# Patient Record
Sex: FEMALE | Race: WHITE | Employment: OTHER | URBAN - METROPOLITAN AREA
[De-identification: names, ages, dates, MRNs, and addresses within clinical notes are randomized per-mention and may not be internally consistent; named-entity substitution may affect disease eponyms.]

---

## 2021-08-31 ENCOUNTER — OFFICE VISIT (OUTPATIENT)
Dept: OBGYN CLINIC | Age: 71
End: 2021-08-31
Payer: MEDICARE

## 2021-08-31 VITALS
SYSTOLIC BLOOD PRESSURE: 171 MMHG | HEIGHT: 63 IN | BODY MASS INDEX: 25.16 KG/M2 | DIASTOLIC BLOOD PRESSURE: 76 MMHG | WEIGHT: 142 LBS

## 2021-08-31 DIAGNOSIS — Z01.419 ROUTINE GYNECOLOGICAL EXAMINATION: Primary | ICD-10-CM

## 2021-08-31 DIAGNOSIS — Z12.31 SCREENING MAMMOGRAM FOR HIGH-RISK PATIENT: ICD-10-CM

## 2021-08-31 DIAGNOSIS — Z12.4 SCREENING FOR MALIGNANT NEOPLASM OF CERVIX: ICD-10-CM

## 2021-08-31 PROCEDURE — 1090F PRES/ABSN URINE INCON ASSESS: CPT | Performed by: OBSTETRICS & GYNECOLOGY

## 2021-08-31 PROCEDURE — G0101 CA SCREEN;PELVIC/BREAST EXAM: HCPCS | Performed by: OBSTETRICS & GYNECOLOGY

## 2021-08-31 PROCEDURE — 3017F COLORECTAL CA SCREEN DOC REV: CPT | Performed by: OBSTETRICS & GYNECOLOGY

## 2021-08-31 PROCEDURE — G8419 CALC BMI OUT NRM PARAM NOF/U: HCPCS | Performed by: OBSTETRICS & GYNECOLOGY

## 2021-08-31 PROCEDURE — G9899 SCRN MAM PERF RSLTS DOC: HCPCS | Performed by: OBSTETRICS & GYNECOLOGY

## 2021-08-31 PROCEDURE — G8432 DEP SCR NOT DOC, RNG: HCPCS | Performed by: OBSTETRICS & GYNECOLOGY

## 2021-08-31 PROCEDURE — 1101F PT FALLS ASSESS-DOCD LE1/YR: CPT | Performed by: OBSTETRICS & GYNECOLOGY

## 2021-08-31 NOTE — PROGRESS NOTES
HISTORY OF PRESENT ILLNESS  Isabel Mosquera is a 73 y.o. female who presents today for the following:  Chief Complaint   Patient presents with    Annual Exam   Is a 82-year-old G2, P2 female who presents today for routine annual exam.  She is without complaints on presentation today.     No Known Allergies    No current outpatient medications on file. No current facility-administered medications for this visit. History reviewed. No pertinent past medical history. History reviewed. No pertinent surgical history. History reviewed. No pertinent family history. Social History     Socioeconomic History    Marital status:      Spouse name: Not on file    Number of children: Not on file    Years of education: Not on file    Highest education level: Not on file   Occupational History    Not on file   Tobacco Use    Smoking status: Current Some Day Smoker    Smokeless tobacco: Never Used   Substance and Sexual Activity    Alcohol use: Not on file    Drug use: Never    Sexual activity: Not Currently   Other Topics Concern    Not on file   Social History Narrative    Not on file     Social Determinants of Health     Financial Resource Strain:     Difficulty of Paying Living Expenses:    Food Insecurity:     Worried About Running Out of Food in the Last Year:     [address] in the Last Year:    Transportation Needs:     Lack of Transportation (Medical):      Lack of Transportation (Non-Medical):    Physical Activity:     Days of Exercise per Week:     Minutes of Exercise per Session:    Stress:     Feeling of Stress :    Social Connections:     Frequency of Communication with Friends and Family:     Frequency of Social Gatherings with Friends and Family:     Attends Temple Services:     Active Member of Clubs or Organizations:     Attends Club or Organization Meetings:     Marital Status:    Intimate Partner Violence:     Fear of Current or Ex-Partner:     Emotionally Abused:     Physically Abused:     Sexually Abused:            REVIEW OF SYSTEMS     Constitutional: Negative for chills, fever and malaise/fatigue. HENT: Negative for congestion, hearing loss and sore throat. Respiratory: Negative for cough, sputum production, shortness of breath and wheezing. Cardiovascular: Negative for chest pain. Gastrointestinal: Negative for abdominal pain, constipation, diarrhea, nausea and vomiting. Genitourinary: Negative for dysuria, flank pain, hematuria and urgency. Neurological: Negative for dizziness, loss of consciousness, weakness and headaches. Psychiatric/Behavioral: Negative for depression.      PHYSICAL EXAM  BP (!) 171/76 (BP 1 Location: Right upper arm, BP Patient Position: Sitting, BP Cuff Size: Small adult)   Ht 5' 3\" (1.6 m)   Wt 142 lb (64.4 kg)   BMI 25.15 kg/m²      Patient is a well-developed well-nourished female no apparent distress  She is alert and oriented x3  Head is normocephalic atraumatic pupils equal round react light accommodation  Neck is supple without adenopathy or thyromegaly  Heart is with regular rate and rhythm without murmurs rubs or gallops  Lungs are clear to auscultation and percussion bilaterally  Breasts are without masses bilaterally  Abdomen is soft nontender nondistended bowel sounds are present and active  Extremities are without clubbing cyanosis or edema  Pulses are full and symmetric bilaterally  Pelvic  External genitalia within normal limits  Urethra is midline there are no apparent urethral lesions the bladder is within normal limits  Vagina is with normal rugae there is minimal discharge present in the vaginal vault  Cervix is parous, there are no apparent cervical lesions, there is no cervical motion tenderness  Uterus is normal size and contours  Adnexa are without masses    ASSESSMENT and PLAN  Normal annual exam  Plan: Pap performed, mammogram ordered, follow-up as needed and yearly  No results found for this visit on 08/31/21. No orders of the defined types were placed in this encounter.

## 2021-09-03 LAB
C TRACH RRNA CVX QL NAA+PROBE: NEGATIVE
CYTOLOGIST CVX/VAG CYTO: NORMAL
CYTOLOGY CVX/VAG DOC CYTO: NORMAL
CYTOLOGY CVX/VAG DOC THIN PREP: NORMAL
DX ICD CODE: NORMAL
LABCORP, 190119: NORMAL
Lab: NORMAL
N GONORRHOEA RRNA CVX QL NAA+PROBE: NEGATIVE
OTHER STN SPEC: NORMAL
STAT OF ADQ CVX/VAG CYTO-IMP: NORMAL
T VAGINALIS RRNA SPEC QL NAA+PROBE: NEGATIVE

## 2022-10-28 ENCOUNTER — APPOINTMENT (RX ONLY)
Dept: URBAN - METROPOLITAN AREA CLINIC 187 | Facility: CLINIC | Age: 72
Setting detail: DERMATOLOGY
End: 2022-10-28

## 2022-10-28 DIAGNOSIS — L57.8 OTHER SKIN CHANGES DUE TO CHRONIC EXPOSURE TO NONIONIZING RADIATION: ICD-10-CM | Status: STABLE

## 2022-10-28 DIAGNOSIS — L81.4 OTHER MELANIN HYPERPIGMENTATION: ICD-10-CM

## 2022-10-28 DIAGNOSIS — L57.0 ACTINIC KERATOSIS: ICD-10-CM

## 2022-10-28 DIAGNOSIS — Z71.89 OTHER SPECIFIED COUNSELING: ICD-10-CM

## 2022-10-28 PROCEDURE — ? LIQUID NITROGEN

## 2022-10-28 PROCEDURE — 99213 OFFICE O/P EST LOW 20 MIN: CPT | Mod: 25

## 2022-10-28 PROCEDURE — ? COUNSELING

## 2022-10-28 PROCEDURE — 17000 DESTRUCT PREMALG LESION: CPT

## 2022-10-28 PROCEDURE — 17003 DESTRUCT PREMALG LES 2-14: CPT

## 2022-10-28 ASSESSMENT — LOCATION DETAILED DESCRIPTION DERM
LOCATION DETAILED: RIGHT MEDIAL DISTAL PRETIBIAL REGION
LOCATION DETAILED: LEFT DISTAL PRETIBIAL REGION
LOCATION DETAILED: RIGHT PROXIMAL DORSAL FOREARM
LOCATION DETAILED: RIGHT LATERAL FOREHEAD
LOCATION DETAILED: RIGHT VENTRAL DISTAL FOREARM
LOCATION DETAILED: RIGHT MEDIAL SUPERIOR CHEST
LOCATION DETAILED: RIGHT POSTERIOR SHOULDER
LOCATION DETAILED: RIGHT ANTERIOR SHOULDER
LOCATION DETAILED: RIGHT ANTERIOR PROXIMAL UPPER ARM
LOCATION DETAILED: LEFT SUPERIOR HELIX
LOCATION DETAILED: RIGHT DISTAL LATERAL CALF
LOCATION DETAILED: RIGHT SUPERIOR UPPER BACK
LOCATION DETAILED: LEFT POSTERIOR SHOULDER
LOCATION DETAILED: LEFT PROXIMAL CALF
LOCATION DETAILED: LEFT ULNAR DORSAL HAND
LOCATION DETAILED: RIGHT PROXIMAL PRETIBIAL REGION
LOCATION DETAILED: LEFT SUPERIOR UPPER BACK
LOCATION DETAILED: RIGHT VENTRAL PROXIMAL FOREARM
LOCATION DETAILED: RIGHT DISTAL PRETIBIAL REGION
LOCATION DETAILED: LEFT PROXIMAL DORSAL FOREARM
LOCATION DETAILED: RIGHT PROXIMAL POSTERIOR UPPER ARM
LOCATION DETAILED: LEFT VENTRAL PROXIMAL FOREARM
LOCATION DETAILED: LEFT ANTERIOR SHOULDER
LOCATION DETAILED: NASAL DORSUM
LOCATION DETAILED: SUPERIOR MID FOREHEAD
LOCATION DETAILED: LEFT DISTAL DORSAL FOREARM
LOCATION DETAILED: RIGHT SUPERIOR HELIX
LOCATION DETAILED: LEFT ANTERIOR PROXIMAL UPPER ARM
LOCATION DETAILED: LEFT INFERIOR LATERAL FOREHEAD
LOCATION DETAILED: RIGHT SUPERIOR LATERAL UPPER BACK
LOCATION DETAILED: RIGHT RADIAL DORSAL HAND
LOCATION DETAILED: LEFT DISTAL CALF
LOCATION DETAILED: LEFT PROXIMAL POSTERIOR UPPER ARM
LOCATION DETAILED: LEFT VENTRAL DISTAL FOREARM
LOCATION DETAILED: RIGHT PROXIMAL RADIAL DORSAL FOREARM
LOCATION DETAILED: RIGHT DISTAL CALF

## 2022-10-28 ASSESSMENT — LOCATION SIMPLE DESCRIPTION DERM
LOCATION SIMPLE: RIGHT HAND
LOCATION SIMPLE: LEFT PRETIBIAL REGION
LOCATION SIMPLE: LEFT UPPER ARM
LOCATION SIMPLE: RIGHT UPPER BACK
LOCATION SIMPLE: LEFT HAND
LOCATION SIMPLE: CHEST
LOCATION SIMPLE: LEFT UPPER BACK
LOCATION SIMPLE: LEFT POSTERIOR UPPER ARM
LOCATION SIMPLE: RIGHT CALF
LOCATION SIMPLE: LEFT FOREARM
LOCATION SIMPLE: RIGHT BACK
LOCATION SIMPLE: SUPERIOR FOREHEAD
LOCATION SIMPLE: LEFT FOREHEAD
LOCATION SIMPLE: RIGHT PRETIBIAL REGION
LOCATION SIMPLE: RIGHT SHOULDER
LOCATION SIMPLE: LEFT CALF
LOCATION SIMPLE: RIGHT EAR
LOCATION SIMPLE: LEFT EAR
LOCATION SIMPLE: RIGHT FOREARM
LOCATION SIMPLE: RIGHT UPPER ARM
LOCATION SIMPLE: NOSE
LOCATION SIMPLE: RIGHT FOREHEAD
LOCATION SIMPLE: RIGHT POSTERIOR UPPER ARM
LOCATION SIMPLE: LEFT SHOULDER

## 2022-10-28 ASSESSMENT — LOCATION ZONE DERM
LOCATION ZONE: TRUNK
LOCATION ZONE: NOSE
LOCATION ZONE: EAR
LOCATION ZONE: ARM
LOCATION ZONE: FACE
LOCATION ZONE: LEG
LOCATION ZONE: HAND

## 2022-10-28 NOTE — PROCEDURE: LIQUID NITROGEN
Render Note In Bullet Format When Appropriate: No
Duration Of Freeze Thaw-Cycle (Seconds): 10
Application Tool (Optional): Cry-AC
Post-Care Instructions: I reviewed with the patient in detail post-care instructions. Patient is to wear sunprotection, and avoid picking at any of the treated lesions. Pt may apply Vaseline to crusted or scabbing areas.
Number Of Freeze-Thaw Cycles: 3 freeze-thaw cycles
Show Aperture Variable?: Yes
Consent: The patient's consent was obtained including but not limited to risks of crusting, scabbing, blistering, scarring, darker or lighter pigmentary change, recurrence, incomplete removal and infection.
Detail Level: Detailed

## 2022-10-28 NOTE — PROCEDURE: MIPS QUALITY
Quality 265: Biopsy Follow-Up: Biopsy results reviewed, communicated, tracked, and documented
Quality 47: Advance Care Plan: Advance Care Planning discussed and documented; advance care plan or surrogate decision maker documented in the medical record.
Quality 110: Preventive Care And Screening: Influenza Immunization: Influenza immunization was not ordered or administered, reason not given
Name And Contact Information For Health Care Proxy:  Harmony Chavezlo
Quality 226: Preventive Care And Screening: Tobacco Use: Screening And Cessation Intervention: Patient screened for tobacco use, is a smoker AND received Cessation Counseling within the Previous 12 Months
Detail Level: Detailed
Quality 130: Documentation Of Current Medications In The Medical Record: Current Medications Documented

## 2022-10-28 NOTE — PROCEDURE: COUNSELING
Sunscreen Recommendations: Elta-MD
Skin Checks Recommendations: Q6-12 months
Detail Level: Detailed
Detail Level: Generalized

## 2022-12-26 ENCOUNTER — APPOINTMENT (OUTPATIENT)
Dept: GENERAL RADIOLOGY | Age: 72
End: 2022-12-26
Attending: FAMILY MEDICINE
Payer: MEDICARE

## 2022-12-26 ENCOUNTER — HOSPITAL ENCOUNTER (EMERGENCY)
Age: 72
Discharge: HOME | End: 2022-12-26
Attending: FAMILY MEDICINE
Payer: MEDICARE

## 2022-12-26 VITALS
SYSTOLIC BLOOD PRESSURE: 154 MMHG | OXYGEN SATURATION: 99 % | RESPIRATION RATE: 16 BRPM | WEIGHT: 132 LBS | DIASTOLIC BLOOD PRESSURE: 51 MMHG | HEART RATE: 62 BPM | TEMPERATURE: 97.9 F | HEIGHT: 63 IN | BODY MASS INDEX: 23.39 KG/M2

## 2022-12-26 DIAGNOSIS — J06.9 VIRAL URI WITH COUGH: Primary | ICD-10-CM

## 2022-12-26 LAB
FLUAV AG NPH QL IA: NEGATIVE
FLUBV AG NOSE QL IA: NEGATIVE

## 2022-12-26 PROCEDURE — 71046 X-RAY EXAM CHEST 2 VIEWS: CPT

## 2022-12-26 PROCEDURE — 87804 INFLUENZA ASSAY W/OPTIC: CPT

## 2022-12-26 PROCEDURE — 99283 EMERGENCY DEPT VISIT LOW MDM: CPT

## 2022-12-26 RX ORDER — PREDNISONE 20 MG/1
40 TABLET ORAL DAILY
Qty: 10 TABLET | Refills: 0 | Status: SHIPPED | OUTPATIENT
Start: 2022-12-26 | End: 2022-12-31

## 2022-12-26 NOTE — Clinical Note
[facility] EMERGENCY DEPARTMENT  [address] [facility] [id number] [phone]    Work/School Note    Date: 12/26/2022    To Whom It May concern:    Rene Hansen was seen and treated today in the emergency room by the following provider(s):  Attending Provider: Liza Nair DO. Rene Hansen is excused from work/school on 12/26/22 and 12/27/22. She is medically clear to return to work/school on 12/28/2022.        Sincerely,          Bhavin Banks DO

## 2022-12-26 NOTE — DISCHARGE INSTRUCTIONS
Thank you! Thank you for allowing me to care for you in the emergency department. It is my goal to provide you with excellent care. If you have not received excellent quality care, please ask to speak to the nurse manager. Please fill out the survey that will come to you by mail or email since we listen to your feedback! Below you will find a list of your tests from today's visit. Should you have any questions, please do not hesitate to call the emergency department. Labs  Recent Results (from the past 12 hour(s))   INFLUENZA A & B AG (RAPID TEST)    Collection Time: 12/26/22  7:40 AM   Result Value Ref Range    Influenza A Antigen Negative Negative      Influenza B Antigen Negative Negative         Radiologic Studies  XR CHEST PA LAT   Final Result   No acute cardiopulmonary disease. CT Results  (Last 48 hours)      None          CXR Results  (Last 48 hours)                 12/26/22 0750  XR CHEST PA LAT Final result    Impression:  No acute cardiopulmonary disease. Narrative: Indication: Cough. Exam: PA and lateral views of the chest.       There is no prior study for direct comparison. Findings: Cardiomediastinal silhouette is within normal limits. Lungs are clear   bilaterally. Pleural spaces are normal. Osseous structures are intact.                 ------------------------------------------------------------------------------------------------------------  The exam and treatment you received in the Emergency Department were for an urgent problem and are not intended as complete care. It is important that you follow-up with a doctor, nurse practitioner, or physician assistant to:  (1) confirm your diagnosis,  (2) re-evaluation of changes in your illness and treatment, and  (3) for ongoing care. Please take your discharge instructions with you when you go to your follow-up appointment.      If you have any problem arranging a follow-up appointment, contact the Emergency Department. If your symptoms become worse or you do not improve as expected and you are unable to reach your health care provider, please return to the Emergency Department. We are available 24 hours a day. If a prescription has been provided, please have it filled as soon as possible to prevent a delay in treatment. If you have any questions or reservations about taking the medication due to side effects or interactions with other medications, please call your primary care provider or contact the ER.

## 2022-12-26 NOTE — ED TRIAGE NOTES
4 days ago, started cough yellow mucus, headache and runny nose, no other symptoms, eating drnking well, voiding well. Cough medicine hasn't helped. Came from [place] 5 days ago.

## 2022-12-26 NOTE — ED PROVIDER NOTES
EMERGENCY DEPARTMENT HISTORY AND PHYSICAL EXAM      Date: 12/26/2022  Patient Name: Ophelia Valdez    History of Presenting Illness         History Provided By: Patient    HPI: Ophelia Valdez, 81 y.o. female presents to the ED with CC of cough, congestion. Family members with similar symptoms. No difficulty breathing. Still eating and drinking well. No fevers chills rigors. Patient denies alleviating nor aggravating factors. Patient denies SOB, chest pain, or any neurological symptoms. Patient denies any other symptoms nor concerns at this time  Past History     Past Medical History:  History reviewed. No pertinent past medical history. Allergies:  No Known Allergies    Review of Systems   Vital signs and nursing notes reviewed  Review of Systems   Constitutional:  Negative for activity change, appetite change, chills, fatigue and fever. HENT:  Positive for congestion. Negative for sore throat. Eyes:  Negative for photophobia and visual disturbance. Respiratory:  Positive for cough. Negative for shortness of breath and wheezing. Cardiovascular:  Negative for chest pain, palpitations and leg swelling. Gastrointestinal:  Negative for abdominal pain, diarrhea, nausea and vomiting. Endocrine: Negative for cold intolerance and heat intolerance. Musculoskeletal:  Negative for gait problem and joint swelling. Skin:  Negative for color change and rash. Neurological:  Negative for dizziness and headaches. Physical Exam   Visit Vitals  BP (!) 154/51 (BP 1 Location: Left upper arm, BP Patient Position: Sitting)   Pulse 62   Temp 97.9 °F (36.6 °C)   Resp 16   Ht 5' 3\" (1.6 m)   Wt 59.9 kg (132 lb)   SpO2 99%   BMI 23.38 kg/m²     CONSTITUTIONAL: Alert, in no distress. Appears stated age.,  Nontoxic  HEAD:  Normocephalic, atraumatic, uvula midline, no muffled voice, no findings of peritonsillar abscess, tolerating secretions with ease  EYES: EOM intact.   No conjunctival injection or scleral icterus  Neck:  Supple. No meningismus,  RESP: No increased work of breathing, lungs clear to auscultation bilaterally. No wheezes rales nor rhonchi  CV: Heart is regular rate and rhythm, no murmurs, no JVD, capillary refill less than 2 seconds  NEURO: Moves all extremities spontaneously. No motor nor sensory deficits. No focal neurologic deficits. PSYCH: Normal mood, normal affect      ED course/medical decision making       Patient presented to the emergency department with the aforementioned chief complaint. On examination the patient is nontoxic and overall well-appearing. No hypoxia. Well-hydrated on exam.  Lungs are clear to auscultation bilaterally. No increased work of breathing. COVID-19 testing was not conducted. Patient was given quarantine/isolation recommendations and agrees with the plan to be discharged home. They were provided instructions to return to the emergency department with any difficulty breathing, chest pain, altered mentation or any other new or worsening symptoms. Patient was discharged home in stable and ambulatory condition. Critical Care Time: None    Disposition:  DISCHARGE NOTE:  The pt is ready for discharge. The pt's signs, symptoms, diagnosis, and discharge instructions have been discussed and pt has conveyed their understanding. The pt is to follow up as recommended or return to ER should their symptoms worsen. Plan has been discussed and pt is in agreement. PLAN:  1. Current Discharge Medication List        START taking these medications    Details   predniSONE (DELTASONE) 20 mg tablet Take 2 Tablets by mouth daily for 5 days. With Breakfast  Qty: 10 Tablet, Refills: 0  Start date: 12/26/2022, End date: 12/31/2022           2. Follow-up Information       Follow up With Specialties Details Why Contact Info    Your primary care doctor  Schedule an appointment as soon as possible for a visit in 2 days            3.  Take Tylenol or Ibuprofen as needed  4. Drink plenty of fluids  5. Return to ED if worse especially if any shortness of breath, chest pain or altered mentation. Diagnosis     Clinical Impression:   1. Viral URI with cough            Please note that this dictation was completed with [employer], the computer voice recognition software. Quite often unanticipated grammatical, syntax, homophones, and other interpretive errors are inadvertently transcribed by the computer software. Please disregards these errors. Please excuse any errors that have escaped final proofreading.

## 2023-04-28 ENCOUNTER — APPOINTMENT (RX ONLY)
Dept: URBAN - METROPOLITAN AREA CLINIC 187 | Facility: CLINIC | Age: 73
Setting detail: DERMATOLOGY
End: 2023-04-28

## 2023-04-28 DIAGNOSIS — L82.1 OTHER SEBORRHEIC KERATOSIS: ICD-10-CM | Status: UNCHANGED

## 2023-04-28 DIAGNOSIS — L85.3 XEROSIS CUTIS: ICD-10-CM

## 2023-04-28 DIAGNOSIS — L57.8 OTHER SKIN CHANGES DUE TO CHRONIC EXPOSURE TO NONIONIZING RADIATION: ICD-10-CM | Status: INADEQUATELY CONTROLLED

## 2023-04-28 DIAGNOSIS — L57.0 ACTINIC KERATOSIS: ICD-10-CM

## 2023-04-28 DIAGNOSIS — D18.0 HEMANGIOMA: ICD-10-CM | Status: UNCHANGED

## 2023-04-28 DIAGNOSIS — L81.4 OTHER MELANIN HYPERPIGMENTATION: ICD-10-CM | Status: UNCHANGED

## 2023-04-28 PROBLEM — D18.01 HEMANGIOMA OF SKIN AND SUBCUTANEOUS TISSUE: Status: ACTIVE | Noted: 2023-04-28

## 2023-04-28 PROCEDURE — ? OTC TREATMENT REGIMEN

## 2023-04-28 PROCEDURE — ? LIQUID NITROGEN

## 2023-04-28 PROCEDURE — ? SUNSCREEN RECOMMENDATIONS

## 2023-04-28 PROCEDURE — ? EDUCATIONAL RESOURCES PROVIDED

## 2023-04-28 PROCEDURE — 17003 DESTRUCT PREMALG LES 2-14: CPT

## 2023-04-28 PROCEDURE — ? COUNSELING

## 2023-04-28 PROCEDURE — 99213 OFFICE O/P EST LOW 20 MIN: CPT | Mod: 25

## 2023-04-28 PROCEDURE — 17000 DESTRUCT PREMALG LESION: CPT

## 2023-04-28 ASSESSMENT — LOCATION DETAILED DESCRIPTION DERM
LOCATION DETAILED: RIGHT LATERAL ABDOMEN
LOCATION DETAILED: RIGHT PROXIMAL DORSAL FOREARM
LOCATION DETAILED: RIGHT PROXIMAL PRETIBIAL REGION
LOCATION DETAILED: LEFT LATERAL ABDOMEN
LOCATION DETAILED: RIGHT DISTAL PRETIBIAL REGION
LOCATION DETAILED: LEFT PROXIMAL DORSAL FOREARM
LOCATION DETAILED: RIGHT MEDIAL SUPERIOR CHEST
LOCATION DETAILED: LEFT ANTERIOR SHOULDER
LOCATION DETAILED: LEFT SUPERIOR LATERAL UPPER BACK
LOCATION DETAILED: LEFT ANTERIOR PROXIMAL THIGH
LOCATION DETAILED: LEFT SUPERIOR MEDIAL MIDBACK
LOCATION DETAILED: UPPER STERNUM
LOCATION DETAILED: RIGHT VENTRAL DISTAL FOREARM
LOCATION DETAILED: MIDDLE STERNUM
LOCATION DETAILED: RIGHT ELBOW
LOCATION DETAILED: RIGHT SUPERIOR UPPER BACK
LOCATION DETAILED: LEFT PROXIMAL PRETIBIAL REGION
LOCATION DETAILED: LEFT POSTERIOR SHOULDER
LOCATION DETAILED: STERNUM
LOCATION DETAILED: LEFT DISTAL POSTERIOR UPPER ARM
LOCATION DETAILED: RIGHT DORSAL WRIST
LOCATION DETAILED: MID POSTERIOR NECK
LOCATION DETAILED: LEFT SUPERIOR MEDIAL UPPER BACK
LOCATION DETAILED: LEFT MEDIAL SUPERIOR CHEST
LOCATION DETAILED: RIGHT DISTAL DORSAL FOREARM
LOCATION DETAILED: RIGHT KNEE
LOCATION DETAILED: LEFT MID-UPPER BACK
LOCATION DETAILED: STERNAL NOTCH
LOCATION DETAILED: LEFT SUPERIOR UPPER BACK
LOCATION DETAILED: EPIGASTRIC SKIN
LOCATION DETAILED: RIGHT MID-UPPER BACK
LOCATION DETAILED: LEFT ANTERIOR DISTAL THIGH
LOCATION DETAILED: LEFT DISTAL DORSAL FOREARM
LOCATION DETAILED: LEFT DISTAL PRETIBIAL REGION
LOCATION DETAILED: RIGHT ANTERIOR SHOULDER
LOCATION DETAILED: RIGHT ANTERIOR DISTAL THIGH
LOCATION DETAILED: LEFT MEDIAL UPPER BACK
LOCATION DETAILED: RIGHT POSTERIOR SHOULDER

## 2023-04-28 ASSESSMENT — LOCATION SIMPLE DESCRIPTION DERM
LOCATION SIMPLE: RIGHT WRIST
LOCATION SIMPLE: RIGHT PRETIBIAL REGION
LOCATION SIMPLE: LEFT FOREARM
LOCATION SIMPLE: LEFT SHOULDER
LOCATION SIMPLE: RIGHT KNEE
LOCATION SIMPLE: RIGHT SHOULDER
LOCATION SIMPLE: LEFT POSTERIOR UPPER ARM
LOCATION SIMPLE: POSTERIOR NECK
LOCATION SIMPLE: ABDOMEN
LOCATION SIMPLE: LEFT PRETIBIAL REGION
LOCATION SIMPLE: RIGHT FOREARM
LOCATION SIMPLE: LEFT LOWER BACK
LOCATION SIMPLE: LEFT THIGH
LOCATION SIMPLE: LEFT UPPER BACK
LOCATION SIMPLE: RIGHT THIGH
LOCATION SIMPLE: CHEST
LOCATION SIMPLE: RIGHT ELBOW
LOCATION SIMPLE: RIGHT UPPER BACK

## 2023-04-28 ASSESSMENT — LOCATION ZONE DERM
LOCATION ZONE: NECK
LOCATION ZONE: TRUNK
LOCATION ZONE: ARM
LOCATION ZONE: LEG

## 2023-04-28 NOTE — HPI: OTHER
Condition:: Skin Lesions
Please Describe Your Condition:: new tender crusted papules on chest x months

## 2023-04-28 NOTE — PROCEDURE: LIQUID NITROGEN
Detail Level: Detailed
Render Post-Care Instructions In Note?: no
Post-Care Instructions: I reviewed with the patient in detail post-care instructions. Patient is to wear sunprotection, and avoid picking at any of the treated lesions. Pt may apply Vaseline to crusted or scabbing areas.
Consent: The patient's consent was obtained including but not limited to risks of crusting, scabbing, blistering, scarring, darker or lighter pigmentary change, recurrence, incomplete removal and infection.
Number Of Freeze-Thaw Cycles: 3 freeze-thaw cycles
Render Note In Bullet Format When Appropriate: Yes
Duration Of Freeze Thaw-Cycle (Seconds): 10
Application Tool (Optional): Cry-AC

## 2025-02-04 ENCOUNTER — APPOINTMENT (OUTPATIENT)
Facility: HOSPITAL | Age: 75
End: 2025-02-04
Payer: MEDICARE

## 2025-02-04 ENCOUNTER — HOSPITAL ENCOUNTER (EMERGENCY)
Facility: HOSPITAL | Age: 75
Discharge: HOME | End: 2025-02-04
Payer: MEDICARE

## 2025-02-04 VITALS
WEIGHT: 135 LBS | OXYGEN SATURATION: 97 % | BODY MASS INDEX: 23.92 KG/M2 | SYSTOLIC BLOOD PRESSURE: 153 MMHG | DIASTOLIC BLOOD PRESSURE: 83 MMHG | TEMPERATURE: 97.6 F | HEART RATE: 63 BPM | RESPIRATION RATE: 18 BRPM | HEIGHT: 63 IN

## 2025-02-04 DIAGNOSIS — S82.031A CLOSED DISPLACED TRANSVERSE FRACTURE OF RIGHT PATELLA, INITIAL ENCOUNTER: Primary | ICD-10-CM

## 2025-02-04 PROCEDURE — 90714 TD VACC NO PRESV 7 YRS+ IM: CPT

## 2025-02-04 PROCEDURE — 6360000002 HC RX W HCPCS

## 2025-02-04 PROCEDURE — 99284 EMERGENCY DEPT VISIT MOD MDM: CPT

## 2025-02-04 PROCEDURE — 73562 X-RAY EXAM OF KNEE 3: CPT

## 2025-02-04 PROCEDURE — 90471 IMMUNIZATION ADMIN: CPT

## 2025-02-04 PROCEDURE — 6370000000 HC RX 637 (ALT 250 FOR IP)

## 2025-02-04 RX ORDER — IBUPROFEN 600 MG/1
600 TABLET, FILM COATED ORAL
Status: COMPLETED | OUTPATIENT
Start: 2025-02-04 | End: 2025-02-04

## 2025-02-04 RX ORDER — ACETAMINOPHEN 500 MG
1000 TABLET ORAL
Status: COMPLETED | OUTPATIENT
Start: 2025-02-04 | End: 2025-02-04

## 2025-02-04 RX ADMIN — IBUPROFEN 600 MG: 600 TABLET, FILM COATED ORAL at 18:54

## 2025-02-04 RX ADMIN — CLOSTRIDIUM TETANI TOXOID ANTIGEN (FORMALDEHYDE INACTIVATED) AND CORYNEBACTERIUM DIPHTHERIAE TOXOID ANTIGEN (FORMALDEHYDE INACTIVATED) 0.5 ML: 5; 2 INJECTION, SUSPENSION INTRAMUSCULAR at 17:22

## 2025-02-04 RX ADMIN — ACETAMINOPHEN 1000 MG: 500 TABLET, FILM COATED ORAL at 17:22

## 2025-02-04 ASSESSMENT — PAIN DESCRIPTION - ORIENTATION: ORIENTATION: RIGHT

## 2025-02-04 ASSESSMENT — PAIN DESCRIPTION - LOCATION: LOCATION: KNEE

## 2025-02-04 ASSESSMENT — PAIN SCALES - GENERAL
PAINLEVEL_OUTOF10: 4
PAINLEVEL_OUTOF10: 10

## 2025-02-04 NOTE — ED PROVIDER NOTES
[facility] EMERGENCY DEPT  EMERGENCY DEPARTMENT HISTORY AND PHYSICAL EXAM      Date: 2/4/2025  Patient Name: Jessa James  MRN: [id number]  YOB: 1950  Date of evaluation: 2/4/2025  Provider: Prema Blanco PA-C   Note Started: 5:38 PM EST 2/4/25    HISTORY OF PRESENT ILLNESS     Chief Complaint   Patient presents with    Fall     At her house, tripped over the extension cord and landed on her right knee. - loc, - thinner, didn't hit her head.        History Provided By: Patient    HPI: Jessa James is a 74 y.o. female who arrives to the ED via EMS after mechanical GLF that occurred immediately prior to arrival.  Patient reports that she was blowing leaves out of her driveway and tripped over the extension cord, landing on her right knee.  She rates her knee pain 10/10 and states that she is now having difficulty bearing weight on her right leg secondary to her knee pain.  She states that she suffered abrasions to this knee but bleeding is controlled at this time.  No numbness or tingling.  She denies hitting her head, LOC, amnesic events, or use of anticoagulation.  No other injuries reported.  EMS placed patient in c-collar, however, patient reports that she has no neck pain or stiffness.  No other injuries or concerns reported.  Unknown last tetanus shot.  No medications taken prior to arrival.    PAST MEDICAL HISTORY   Past Medical History:  No past medical history on file.    Past Surgical History:  No past surgical history on file.    Family History:  No family history on file.    Social History:  Social History     Tobacco Use    Smoking status: Every Day     Current packs/day: 0.50     Types: Cigarettes    Smokeless tobacco: Never   Substance Use Topics    Alcohol use: Never    Drug use: Never       Allergies:  No Known Allergies    PCP: No primary care provider on file.    Current Meds:   No current facility-administered medications for this encounter.     No current outpatient medications on

## 2025-02-05 ENCOUNTER — ANESTHESIA EVENT (OUTPATIENT)
Facility: HOSPITAL | Age: 75
End: 2025-02-05
Payer: MEDICARE

## 2025-02-05 ENCOUNTER — OFFICE VISIT (OUTPATIENT)
Age: 75
End: 2025-02-05
Payer: MEDICARE

## 2025-02-05 VITALS
DIASTOLIC BLOOD PRESSURE: 80 MMHG | HEART RATE: 58 BPM | WEIGHT: 135 LBS | OXYGEN SATURATION: 96 % | HEIGHT: 63 IN | SYSTOLIC BLOOD PRESSURE: 161 MMHG | BODY MASS INDEX: 23.92 KG/M2 | TEMPERATURE: 98.2 F

## 2025-02-05 DIAGNOSIS — S82.031A CLOSED DISPLACED TRANSVERSE FRACTURE OF RIGHT PATELLA, INITIAL ENCOUNTER: Primary | ICD-10-CM

## 2025-02-05 PROCEDURE — 1090F PRES/ABSN URINE INCON ASSESS: CPT | Performed by: ORTHOPAEDIC SURGERY

## 2025-02-05 PROCEDURE — 3017F COLORECTAL CA SCREEN DOC REV: CPT | Performed by: ORTHOPAEDIC SURGERY

## 2025-02-05 PROCEDURE — G8399 PT W/DXA RESULTS DOCUMENT: HCPCS | Performed by: ORTHOPAEDIC SURGERY

## 2025-02-05 PROCEDURE — 99203 OFFICE O/P NEW LOW 30 MIN: CPT | Performed by: ORTHOPAEDIC SURGERY

## 2025-02-05 PROCEDURE — 1036F TOBACCO NON-USER: CPT | Performed by: ORTHOPAEDIC SURGERY

## 2025-02-05 PROCEDURE — G8420 CALC BMI NORM PARAMETERS: HCPCS | Performed by: ORTHOPAEDIC SURGERY

## 2025-02-05 PROCEDURE — G8427 DOCREV CUR MEDS BY ELIG CLIN: HCPCS | Performed by: ORTHOPAEDIC SURGERY

## 2025-02-05 PROCEDURE — 1123F ACP DISCUSS/DSCN MKR DOCD: CPT | Performed by: ORTHOPAEDIC SURGERY

## 2025-02-05 PROCEDURE — 1125F AMNT PAIN NOTED PAIN PRSNT: CPT | Performed by: ORTHOPAEDIC SURGERY

## 2025-02-05 RX ORDER — SENNA AND DOCUSATE SODIUM 50; 8.6 MG/1; MG/1
1 TABLET, FILM COATED ORAL 2 TIMES DAILY PRN
Qty: 30 TABLET | Refills: 0 | Status: SHIPPED | OUTPATIENT
Start: 2025-02-05

## 2025-02-05 RX ORDER — HYDROCODONE BITARTRATE AND ACETAMINOPHEN 7.5; 325 MG/1; MG/1
1 TABLET ORAL EVERY 6 HOURS PRN
Qty: 28 TABLET | Refills: 0 | Status: SHIPPED | OUTPATIENT
Start: 2025-02-05 | End: 2025-02-12

## 2025-02-05 RX ORDER — ONDANSETRON 4 MG/1
4 TABLET, ORALLY DISINTEGRATING ORAL EVERY 8 HOURS PRN
Qty: 10 TABLET | Refills: 1 | Status: SHIPPED | OUTPATIENT
Start: 2025-02-05

## 2025-02-05 RX ORDER — VIT A/VIT C/VIT E/ZINC/COPPER 4296-226
CAPSULE ORAL
COMMUNITY

## 2025-02-05 RX ORDER — ASPIRIN 81 MG/1
81 TABLET ORAL 2 TIMES DAILY
Qty: 60 TABLET | Refills: 0 | Status: SHIPPED | OUTPATIENT
Start: 2025-02-05

## 2025-02-05 RX ORDER — IBUPROFEN 600 MG/1
600 TABLET, FILM COATED ORAL 3 TIMES DAILY PRN
Qty: 90 TABLET | Refills: 2 | Status: SHIPPED | OUTPATIENT
Start: 2025-02-05

## 2025-02-05 ASSESSMENT — PATIENT HEALTH QUESTIONNAIRE - PHQ9
2. FEELING DOWN, DEPRESSED OR HOPELESS: NOT AT ALL
1. LITTLE INTEREST OR PLEASURE IN DOING THINGS: NOT AT ALL
SUM OF ALL RESPONSES TO PHQ QUESTIONS 1-9: 0
SUM OF ALL RESPONSES TO PHQ9 QUESTIONS 1 & 2: 0

## 2025-02-05 NOTE — DISCHARGE INSTRUCTIONS
medication due to side effects or interactions with other medications, please call your primary care provider or contact us directly.  Again, THANK YOU for choosing us to care for YOU!

## 2025-02-05 NOTE — ANESTHESIA PRE PROCEDURE
Department of Anesthesiology  Preprocedure Note       Name:  Jessa James   Age:  74 y.o.  :  1950                                          MRN:  [id number]         Date:  2025      Surgeon: Surgeon(s):  Carmen Yao MD    Procedure: Procedure(s):  RIGHT PATELLA OPEN REDUCTION INTERNAL FIXATION (GENERAL & REGIONAL NERVE BLOCK)    Medications prior to admission:   Prior to Admission medications    Medication Sig Start Date End Date Taking? Authorizing Provider   Multiple Vitamins-Minerals (PRESERVISION AREDS) CAPS Take by mouth   Yes Provider, MD Judy   sennosides-docusate sodium (SENOKOT-S) 8.6-50 MG tablet Take 1 tablet by mouth 2 times daily as needed for Constipation 25   Carmen Yao MD   ondansetron (ZOFRAN-ODT) 4 MG disintegrating tablet Take 1 tablet by mouth every 8 hours as needed for Nausea or Vomiting 25   Carmen Yao MD   aspirin 81 MG EC tablet Take 1 tablet by mouth in the morning and at bedtime Take for 30 days to reduce risk of blood clots 25   Carmen Yao MD   HYDROcodone-acetaminophen (NORCO) 7.5-325 MG per tablet Take 1 tablet by mouth every 6 hours as needed for Pain for up to 7 days. Intended supply: 5 days. Take lowest dose possible to manage pain Max Daily Amount: 4 tablets 25  Carmen Yao MD   ibuprofen (ADVIL;MOTRIN) 600 MG tablet Take 1 tablet by mouth 3 times daily as needed for Pain 25   Carmen Yao MD       Current medications:    No current facility-administered medications for this encounter.     Current Outpatient Medications   Medication Sig Dispense Refill   • Multiple Vitamins-Minerals (PRESERVISION AREDS) CAPS Take by mouth     • sennosides-docusate sodium (SENOKOT-S) 8.6-50 MG tablet Take 1 tablet by mouth 2 times daily as needed for Constipation 30 tablet 0   • ondansetron (ZOFRAN-ODT) 4 MG disintegrating tablet Take 1 tablet by mouth every 8 hours as

## 2025-02-05 NOTE — PROGRESS NOTES
Jessa James (: 1950) is a 74 y.o. female, new patient, here for evaluation of the following chief complaint(s):  New Patient (ED F/U right knee Fx Pt in brace )       ASSESSMENT/PLAN:  Below is the assessment and plan developed based on review of pertinent history, physical exam, labs, studies, and medications.  Available imaging was independently reviewed including 3 views of the right knee which were personally reviewed and interpreted by me and show evidence of a transverse patella fracture with displacement and step-off at the joint surface with gapping anteriorly.    Discussed diagnosis of right knee patella fracture with the patient and treatment options.  Given the amount of displacement and step-off of the joint service would recommend surgery with right patella open reduction internal fixation.  The risk, benefits, and alternatives of procedure were explained to the patient including the risk of infection, bleeding, damage to surrounding nerves, vessels, tendons, need for further procedures, chronic pain, wound healing issues, stiffness, blood clots, nonunion, malunion, posttraumatic osteoarthritis.  The patient will be scheduled for surgery on .  The normal postoperative course was discussed with the patient.  All questions were answered.    1. Closed displaced transverse fracture of right patella, initial encounter  -     HYDROcodone-acetaminophen (NORCO) 7.5-325 MG per tablet; Take 1 tablet by mouth every 6 hours as needed for Pain for up to 7 days. Intended supply: 5 days. Take lowest dose possible to manage pain Max Daily Amount: 4 tablets, Disp-28 tablet, R-0Normal (Patient taking differently: 1 tablet, Oral, EVERY 6 HOURS PRN, Pain, Pt has for after surgery, Intended supply: 5 days. Take lowest dose possible to manage pain)      No follow-ups on file.       SUBJECTIVE/OBJECTIVE:  Jessa James (: 1950) is a 74 y.o. female.    She is coming in with right patella

## 2025-02-05 NOTE — PROGRESS NOTES
Identified pt with two pt identifiers (name and ). Reviewed chart in preparation for visit and have obtained necessary documentation.    Jessa James is a 74 y.o. female New Patient (ED F/U right knee Fx Pt in brace )  .    Vitals:    25 1147 25 1151   BP: (!) 164/85 (!) 161/80   Site: Left Upper Arm Left Upper Arm   Position: Sitting Sitting   Cuff Size: Medium Adult Medium Adult   Pulse: 58    Temp: 98.2 °F (36.8 °C)    TempSrc: Temporal    SpO2: 96%    Weight: 61.2 kg (135 lb)    Height: 1.6 m (5' 2.99\")           1. Have you been to the ER, urgent care clinic since your last visit?  Hospitalized since your last visit?  no     2. Have you seen or consulted any other health care providers outside of the [facility] since your last visit?  Include any pap smears or colon screening.  No  BP elevated. Patient advised to  follow up with PCP and check BP twice a day at home.

## 2025-02-06 ENCOUNTER — HOSPITAL ENCOUNTER (OUTPATIENT)
Facility: HOSPITAL | Age: 75
Setting detail: OUTPATIENT SURGERY
Discharge: HOME | End: 2025-02-06
Attending: ORTHOPAEDIC SURGERY | Admitting: ORTHOPAEDIC SURGERY
Payer: MEDICARE

## 2025-02-06 ENCOUNTER — APPOINTMENT (OUTPATIENT)
Facility: HOSPITAL | Age: 75
End: 2025-02-06
Attending: ORTHOPAEDIC SURGERY
Payer: MEDICARE

## 2025-02-06 ENCOUNTER — ANESTHESIA (OUTPATIENT)
Facility: HOSPITAL | Age: 75
End: 2025-02-06
Payer: MEDICARE

## 2025-02-06 VITALS
HEART RATE: 65 BPM | WEIGHT: 135 LBS | RESPIRATION RATE: 16 BRPM | TEMPERATURE: 97.4 F | HEIGHT: 63 IN | OXYGEN SATURATION: 92 % | BODY MASS INDEX: 23.92 KG/M2 | SYSTOLIC BLOOD PRESSURE: 117 MMHG | DIASTOLIC BLOOD PRESSURE: 51 MMHG

## 2025-02-06 DIAGNOSIS — S82.031A CLOSED DISPLACED TRANSVERSE FRACTURE OF RIGHT PATELLA, INITIAL ENCOUNTER: ICD-10-CM

## 2025-02-06 PROCEDURE — 27524 TREAT KNEECAP FRACTURE: CPT | Performed by: ORTHOPAEDIC SURGERY

## 2025-02-06 PROCEDURE — 6360000002 HC RX W HCPCS: Performed by: ANESTHESIOLOGY

## 2025-02-06 PROCEDURE — 2500000003 HC RX 250 WO HCPCS: Performed by: ORTHOPAEDIC SURGERY

## 2025-02-06 PROCEDURE — 7100000000 HC PACU RECOVERY - FIRST 15 MIN: Performed by: ORTHOPAEDIC SURGERY

## 2025-02-06 PROCEDURE — 64447 NJX AA&/STRD FEMORAL NRV IMG: CPT | Performed by: ANESTHESIOLOGY

## 2025-02-06 PROCEDURE — 7100000011 HC PHASE II RECOVERY - ADDTL 15 MIN: Performed by: ORTHOPAEDIC SURGERY

## 2025-02-06 PROCEDURE — 6370000000 HC RX 637 (ALT 250 FOR IP): Performed by: ORTHOPAEDIC SURGERY

## 2025-02-06 PROCEDURE — 2720000010 HC SURG SUPPLY STERILE: Performed by: ORTHOPAEDIC SURGERY

## 2025-02-06 PROCEDURE — 7100000001 HC PACU RECOVERY - ADDTL 15 MIN: Performed by: ORTHOPAEDIC SURGERY

## 2025-02-06 PROCEDURE — 3700000000 HC ANESTHESIA ATTENDED CARE: Performed by: ORTHOPAEDIC SURGERY

## 2025-02-06 PROCEDURE — 2580000003 HC RX 258: Performed by: ORTHOPAEDIC SURGERY

## 2025-02-06 PROCEDURE — 76000 FLUOROSCOPY <1 HR PHYS/QHP: CPT

## 2025-02-06 PROCEDURE — 7100000010 HC PHASE II RECOVERY - FIRST 15 MIN: Performed by: ORTHOPAEDIC SURGERY

## 2025-02-06 PROCEDURE — 3600000003 HC SURGERY LEVEL 3 BASE: Performed by: ORTHOPAEDIC SURGERY

## 2025-02-06 PROCEDURE — 6360000002 HC RX W HCPCS

## 2025-02-06 PROCEDURE — C1713 ANCHOR/SCREW BN/BN,TIS/BN: HCPCS | Performed by: ORTHOPAEDIC SURGERY

## 2025-02-06 PROCEDURE — 2709999900 HC NON-CHARGEABLE SUPPLY: Performed by: ORTHOPAEDIC SURGERY

## 2025-02-06 PROCEDURE — 3600000013 HC SURGERY LEVEL 3 ADDTL 15MIN: Performed by: ORTHOPAEDIC SURGERY

## 2025-02-06 PROCEDURE — 3700000001 HC ADD 15 MINUTES (ANESTHESIA): Performed by: ORTHOPAEDIC SURGERY

## 2025-02-06 PROCEDURE — 6360000002 HC RX W HCPCS: Performed by: ORTHOPAEDIC SURGERY

## 2025-02-06 PROCEDURE — 2500000003 HC RX 250 WO HCPCS

## 2025-02-06 DEVICE — IMPLANTABLE DEVICE: Type: IMPLANTABLE DEVICE | Site: PATELLA | Status: FUNCTIONAL

## 2025-02-06 RX ORDER — CELECOXIB 100 MG/1
100 CAPSULE ORAL ONCE
Status: COMPLETED | OUTPATIENT
Start: 2025-02-06 | End: 2025-02-06

## 2025-02-06 RX ORDER — LIDOCAINE 4 G/G
1 PATCH TOPICAL AS NEEDED
Status: DISCONTINUED | OUTPATIENT
Start: 2025-02-06 | End: 2025-02-06 | Stop reason: HOSPADM

## 2025-02-06 RX ORDER — SODIUM CHLORIDE 0.9 % (FLUSH) 0.9 %
5-40 SYRINGE (ML) INJECTION PRN
Status: DISCONTINUED | OUTPATIENT
Start: 2025-02-06 | End: 2025-02-06 | Stop reason: HOSPADM

## 2025-02-06 RX ORDER — TRANEXAMIC ACID 100 MG/ML
INJECTION, SOLUTION INTRAVENOUS
Status: DISCONTINUED | OUTPATIENT
Start: 2025-02-06 | End: 2025-02-06 | Stop reason: SDUPTHER

## 2025-02-06 RX ORDER — ONDANSETRON 2 MG/ML
INJECTION INTRAMUSCULAR; INTRAVENOUS
Status: DISCONTINUED | OUTPATIENT
Start: 2025-02-06 | End: 2025-02-06 | Stop reason: SDUPTHER

## 2025-02-06 RX ORDER — SODIUM CHLORIDE 0.9 % (FLUSH) 0.9 %
5-40 SYRINGE (ML) INJECTION EVERY 12 HOURS SCHEDULED
Status: DISCONTINUED | OUTPATIENT
Start: 2025-02-06 | End: 2025-02-06 | Stop reason: HOSPADM

## 2025-02-06 RX ORDER — OXYCODONE HYDROCHLORIDE 5 MG/1
10 TABLET ORAL PRN
Status: DISCONTINUED | OUTPATIENT
Start: 2025-02-06 | End: 2025-02-06 | Stop reason: HOSPADM

## 2025-02-06 RX ORDER — GLUCAGON 1 MG/ML
1 KIT INJECTION PRN
Status: DISCONTINUED | OUTPATIENT
Start: 2025-02-06 | End: 2025-02-06 | Stop reason: HOSPADM

## 2025-02-06 RX ORDER — SODIUM CHLORIDE 9 MG/ML
INJECTION, SOLUTION INTRAVENOUS PRN
Status: DISCONTINUED | OUTPATIENT
Start: 2025-02-06 | End: 2025-02-06 | Stop reason: HOSPADM

## 2025-02-06 RX ORDER — LIDOCAINE HYDROCHLORIDE 20 MG/ML
INJECTION, SOLUTION EPIDURAL; INFILTRATION; INTRACAUDAL; PERINEURAL
Status: DISCONTINUED | OUTPATIENT
Start: 2025-02-06 | End: 2025-02-06 | Stop reason: SDUPTHER

## 2025-02-06 RX ORDER — MINERAL OIL
OIL (ML) MISCELLANEOUS PRN
Status: DISCONTINUED | OUTPATIENT
Start: 2025-02-06 | End: 2025-02-06 | Stop reason: ALTCHOICE

## 2025-02-06 RX ORDER — PROPOFOL 10 MG/ML
INJECTION, EMULSION INTRAVENOUS
Status: DISCONTINUED | OUTPATIENT
Start: 2025-02-06 | End: 2025-02-06 | Stop reason: SDUPTHER

## 2025-02-06 RX ORDER — OXYCODONE HYDROCHLORIDE 5 MG/1
5 TABLET ORAL PRN
Status: DISCONTINUED | OUTPATIENT
Start: 2025-02-06 | End: 2025-02-06 | Stop reason: HOSPADM

## 2025-02-06 RX ORDER — FENTANYL CITRATE 50 UG/ML
INJECTION, SOLUTION INTRAMUSCULAR; INTRAVENOUS
Status: DISCONTINUED | OUTPATIENT
Start: 2025-02-06 | End: 2025-02-06 | Stop reason: SDUPTHER

## 2025-02-06 RX ORDER — PHENYLEPHRINE HCL IN 0.9% NACL 0.4MG/10ML
SYRINGE (ML) INTRAVENOUS
Status: DISCONTINUED | OUTPATIENT
Start: 2025-02-06 | End: 2025-02-06 | Stop reason: SDUPTHER

## 2025-02-06 RX ORDER — FENTANYL CITRATE 0.05 MG/ML
50 INJECTION, SOLUTION INTRAMUSCULAR; INTRAVENOUS EVERY 5 MIN PRN
Status: DISCONTINUED | OUTPATIENT
Start: 2025-02-06 | End: 2025-02-06 | Stop reason: HOSPADM

## 2025-02-06 RX ORDER — HYDROMORPHONE HYDROCHLORIDE 1 MG/ML
0.5 INJECTION, SOLUTION INTRAMUSCULAR; INTRAVENOUS; SUBCUTANEOUS EVERY 5 MIN PRN
Status: DISCONTINUED | OUTPATIENT
Start: 2025-02-06 | End: 2025-02-06 | Stop reason: HOSPADM

## 2025-02-06 RX ORDER — MEPERIDINE HYDROCHLORIDE 25 MG/ML
12.5 INJECTION INTRAMUSCULAR; INTRAVENOUS; SUBCUTANEOUS EVERY 5 MIN PRN
Status: DISCONTINUED | OUTPATIENT
Start: 2025-02-06 | End: 2025-02-06 | Stop reason: HOSPADM

## 2025-02-06 RX ORDER — SODIUM CHLORIDE, SODIUM LACTATE, POTASSIUM CHLORIDE, CALCIUM CHLORIDE 600; 310; 30; 20 MG/100ML; MG/100ML; MG/100ML; MG/100ML
INJECTION, SOLUTION INTRAVENOUS ONCE
Status: DISCONTINUED | OUTPATIENT
Start: 2025-02-06 | End: 2025-02-06 | Stop reason: HOSPADM

## 2025-02-06 RX ORDER — ONDANSETRON 2 MG/ML
4 INJECTION INTRAMUSCULAR; INTRAVENOUS
Status: DISCONTINUED | OUTPATIENT
Start: 2025-02-06 | End: 2025-02-06 | Stop reason: HOSPADM

## 2025-02-06 RX ORDER — LORAZEPAM 2 MG/ML
0.5 INJECTION INTRAMUSCULAR
Status: DISCONTINUED | OUTPATIENT
Start: 2025-02-06 | End: 2025-02-06 | Stop reason: HOSPADM

## 2025-02-06 RX ORDER — ROPIVACAINE HYDROCHLORIDE 5 MG/ML
INJECTION, SOLUTION EPIDURAL; INFILTRATION; PERINEURAL
Status: COMPLETED | OUTPATIENT
Start: 2025-02-06 | End: 2025-02-06

## 2025-02-06 RX ORDER — ROCURONIUM BROMIDE 10 MG/ML
INJECTION, SOLUTION INTRAVENOUS
Status: DISCONTINUED | OUTPATIENT
Start: 2025-02-06 | End: 2025-02-06 | Stop reason: SDUPTHER

## 2025-02-06 RX ORDER — IPRATROPIUM BROMIDE AND ALBUTEROL SULFATE 2.5; .5 MG/3ML; MG/3ML
1 SOLUTION RESPIRATORY (INHALATION)
Status: DISCONTINUED | OUTPATIENT
Start: 2025-02-06 | End: 2025-02-06 | Stop reason: HOSPADM

## 2025-02-06 RX ORDER — DEXTROSE MONOHYDRATE 100 MG/ML
INJECTION, SOLUTION INTRAVENOUS CONTINUOUS PRN
Status: DISCONTINUED | OUTPATIENT
Start: 2025-02-06 | End: 2025-02-06 | Stop reason: HOSPADM

## 2025-02-06 RX ORDER — METOCLOPRAMIDE HYDROCHLORIDE 5 MG/ML
10 INJECTION INTRAMUSCULAR; INTRAVENOUS
Status: DISCONTINUED | OUTPATIENT
Start: 2025-02-06 | End: 2025-02-06 | Stop reason: HOSPADM

## 2025-02-06 RX ORDER — DIPHENHYDRAMINE HYDROCHLORIDE 50 MG/ML
12.5 INJECTION INTRAMUSCULAR; INTRAVENOUS
Status: DISCONTINUED | OUTPATIENT
Start: 2025-02-06 | End: 2025-02-06 | Stop reason: HOSPADM

## 2025-02-06 RX ORDER — ACETAMINOPHEN 500 MG
1000 TABLET ORAL ONCE
Status: COMPLETED | OUTPATIENT
Start: 2025-02-06 | End: 2025-02-06

## 2025-02-06 RX ORDER — EPHEDRINE SULFATE 50 MG/ML
INJECTION INTRAVENOUS
Status: DISCONTINUED | OUTPATIENT
Start: 2025-02-06 | End: 2025-02-06 | Stop reason: SDUPTHER

## 2025-02-06 RX ORDER — DEXMEDETOMIDINE HYDROCHLORIDE 100 UG/ML
INJECTION, SOLUTION INTRAVENOUS
Status: DISCONTINUED | OUTPATIENT
Start: 2025-02-06 | End: 2025-02-06 | Stop reason: SDUPTHER

## 2025-02-06 RX ORDER — LABETALOL HYDROCHLORIDE 5 MG/ML
10 INJECTION, SOLUTION INTRAVENOUS
Status: DISCONTINUED | OUTPATIENT
Start: 2025-02-06 | End: 2025-02-06 | Stop reason: HOSPADM

## 2025-02-06 RX ORDER — DEXAMETHASONE SODIUM PHOSPHATE 4 MG/ML
INJECTION, SOLUTION INTRA-ARTICULAR; INTRALESIONAL; INTRAMUSCULAR; INTRAVENOUS; SOFT TISSUE
Status: DISCONTINUED | OUTPATIENT
Start: 2025-02-06 | End: 2025-02-06 | Stop reason: SDUPTHER

## 2025-02-06 RX ORDER — HYDRALAZINE HYDROCHLORIDE 20 MG/ML
10 INJECTION INTRAMUSCULAR; INTRAVENOUS
Status: DISCONTINUED | OUTPATIENT
Start: 2025-02-06 | End: 2025-02-06 | Stop reason: HOSPADM

## 2025-02-06 RX ORDER — ACETAMINOPHEN 325 MG/1
650 TABLET ORAL EVERY 6 HOURS PRN
Status: ON HOLD | COMMUNITY
End: 2025-02-06 | Stop reason: HOSPADM

## 2025-02-06 RX ORDER — MIDAZOLAM HYDROCHLORIDE 1 MG/ML
INJECTION, SOLUTION INTRAMUSCULAR; INTRAVENOUS
Status: DISCONTINUED | OUTPATIENT
Start: 2025-02-06 | End: 2025-02-06 | Stop reason: SDUPTHER

## 2025-02-06 RX ORDER — NALOXONE HYDROCHLORIDE 0.4 MG/ML
INJECTION, SOLUTION INTRAMUSCULAR; INTRAVENOUS; SUBCUTANEOUS PRN
Status: DISCONTINUED | OUTPATIENT
Start: 2025-02-06 | End: 2025-02-06 | Stop reason: HOSPADM

## 2025-02-06 RX ORDER — ROPIVACAINE HYDROCHLORIDE 5 MG/ML
INJECTION, SOLUTION EPIDURAL; INFILTRATION; PERINEURAL
Status: COMPLETED
Start: 2025-02-06 | End: 2025-02-06

## 2025-02-06 RX ADMIN — TRANEXAMIC ACID 100 MG: 1 INJECTION, SOLUTION INTRAVENOUS at 09:51

## 2025-02-06 RX ADMIN — SODIUM CHLORIDE: 9 INJECTION, SOLUTION INTRAVENOUS at 08:45

## 2025-02-06 RX ADMIN — Medication 100 MCG: at 09:06

## 2025-02-06 RX ADMIN — FENTANYL CITRATE 50 MCG: 50 INJECTION INTRAMUSCULAR; INTRAVENOUS at 08:50

## 2025-02-06 RX ADMIN — TRANEXAMIC ACID 100 MG: 1 INJECTION, SOLUTION INTRAVENOUS at 09:01

## 2025-02-06 RX ADMIN — Medication 200 MCG: at 09:14

## 2025-02-06 RX ADMIN — Medication 100 MCG: at 09:46

## 2025-02-06 RX ADMIN — LIDOCAINE HYDROCHLORIDE 60 MG: 20 INJECTION, SOLUTION EPIDURAL; INFILTRATION; INTRACAUDAL; PERINEURAL at 08:50

## 2025-02-06 RX ADMIN — Medication 50 MCG: at 09:55

## 2025-02-06 RX ADMIN — CELECOXIB 100 MG: 100 CAPSULE ORAL at 08:14

## 2025-02-06 RX ADMIN — TRANEXAMIC ACID 100 MG: 1 INJECTION, SOLUTION INTRAVENOUS at 08:58

## 2025-02-06 RX ADMIN — TRANEXAMIC ACID 100 MG: 1 INJECTION, SOLUTION INTRAVENOUS at 09:04

## 2025-02-06 RX ADMIN — DEXAMETHASONE SODIUM PHOSPHATE 8 MG: 4 INJECTION, SOLUTION INTRA-ARTICULAR; INTRALESIONAL; INTRAMUSCULAR; INTRAVENOUS; SOFT TISSUE at 09:02

## 2025-02-06 RX ADMIN — TRANEXAMIC ACID 100 MG: 1 INJECTION, SOLUTION INTRAVENOUS at 09:52

## 2025-02-06 RX ADMIN — Medication 50 MCG: at 09:58

## 2025-02-06 RX ADMIN — TRANEXAMIC ACID 100 MG: 1 INJECTION, SOLUTION INTRAVENOUS at 09:47

## 2025-02-06 RX ADMIN — TRANEXAMIC ACID 100 MG: 1 INJECTION, SOLUTION INTRAVENOUS at 09:54

## 2025-02-06 RX ADMIN — TRANEXAMIC ACID 100 MG: 1 INJECTION, SOLUTION INTRAVENOUS at 09:53

## 2025-02-06 RX ADMIN — TRANEXAMIC ACID 100 MG: 1 INJECTION, SOLUTION INTRAVENOUS at 09:03

## 2025-02-06 RX ADMIN — SUGAMMADEX 150 MG: 100 INJECTION, SOLUTION INTRAVENOUS at 10:06

## 2025-02-06 RX ADMIN — TRANEXAMIC ACID 100 MG: 1 INJECTION, SOLUTION INTRAVENOUS at 09:56

## 2025-02-06 RX ADMIN — TRANEXAMIC ACID 100 MG: 1 INJECTION, SOLUTION INTRAVENOUS at 09:55

## 2025-02-06 RX ADMIN — Medication 200 MCG: at 09:28

## 2025-02-06 RX ADMIN — MIDAZOLAM 1 MG: 1 INJECTION INTRAMUSCULAR; INTRAVENOUS at 08:29

## 2025-02-06 RX ADMIN — TRANEXAMIC ACID 100 MG: 1 INJECTION, SOLUTION INTRAVENOUS at 09:48

## 2025-02-06 RX ADMIN — DEXMEDETOMIDINE 4 MCG: 100 INJECTION, SOLUTION INTRAVENOUS at 09:46

## 2025-02-06 RX ADMIN — ONDANSETRON 4 MG: 2 INJECTION INTRAMUSCULAR; INTRAVENOUS at 09:49

## 2025-02-06 RX ADMIN — ROCURONIUM BROMIDE 50 MG: 10 INJECTION, SOLUTION INTRAVENOUS at 08:51

## 2025-02-06 RX ADMIN — TRANEXAMIC ACID 100 MG: 1 INJECTION, SOLUTION INTRAVENOUS at 09:49

## 2025-02-06 RX ADMIN — TRANEXAMIC ACID 100 MG: 1 INJECTION, SOLUTION INTRAVENOUS at 09:00

## 2025-02-06 RX ADMIN — ONDANSETRON 4 MG: 2 INJECTION INTRAMUSCULAR; INTRAVENOUS at 08:50

## 2025-02-06 RX ADMIN — Medication 100 MCG: at 09:37

## 2025-02-06 RX ADMIN — CEFAZOLIN 2000 MG: 1 INJECTION, POWDER, FOR SOLUTION INTRAMUSCULAR; INTRAVENOUS at 08:45

## 2025-02-06 RX ADMIN — PROPOFOL 120 MG: 10 INJECTION, EMULSION INTRAVENOUS at 08:50

## 2025-02-06 RX ADMIN — TRANEXAMIC ACID 100 MG: 1 INJECTION, SOLUTION INTRAVENOUS at 09:50

## 2025-02-06 RX ADMIN — ROPIVACAINE HYDROCHLORIDE 30 ML: 5 INJECTION, SOLUTION EPIDURAL; INFILTRATION; PERINEURAL at 08:33

## 2025-02-06 RX ADMIN — TRANEXAMIC ACID 100 MG: 1 INJECTION, SOLUTION INTRAVENOUS at 08:57

## 2025-02-06 RX ADMIN — FENTANYL CITRATE 50 MCG: 50 INJECTION INTRAMUSCULAR; INTRAVENOUS at 08:29

## 2025-02-06 RX ADMIN — TRANEXAMIC ACID 100 MG: 1 INJECTION, SOLUTION INTRAVENOUS at 08:56

## 2025-02-06 RX ADMIN — ACETAMINOPHEN 1000 MG: 500 TABLET ORAL at 08:14

## 2025-02-06 RX ADMIN — EPHEDRINE SULFATE 10 MG: 50 INJECTION INTRAVENOUS at 09:19

## 2025-02-06 RX ADMIN — TRANEXAMIC ACID 100 MG: 1 INJECTION, SOLUTION INTRAVENOUS at 09:05

## 2025-02-06 RX ADMIN — TRANEXAMIC ACID 100 MG: 1 INJECTION, SOLUTION INTRAVENOUS at 08:59

## 2025-02-06 RX ADMIN — Medication 100 MCG: at 10:01

## 2025-02-06 RX ADMIN — TRANEXAMIC ACID 100 MG: 1 INJECTION, SOLUTION INTRAVENOUS at 09:02

## 2025-02-06 RX ADMIN — Medication 100 MCG: at 09:10

## 2025-02-06 ASSESSMENT — PAIN - FUNCTIONAL ASSESSMENT
PAIN_FUNCTIONAL_ASSESSMENT: NONE - DENIES PAIN
PAIN_FUNCTIONAL_ASSESSMENT: 0-10

## 2025-02-06 ASSESSMENT — PAIN DESCRIPTION - DESCRIPTORS: DESCRIPTORS: ACHING

## 2025-02-06 NOTE — OP NOTE
Patient: Jessa James  YOB: 1950  MRN: [id number]     Date of Procedure: 2/6/2025     Pre-Op Diagnosis Codes:      * Closed displaced transverse fracture of right patella, initial encounter [S82.031A]     Post-Op Diagnosis: Same       Procedure(s):  RIGHT PATELLA OPEN REDUCTION INTERNAL FIXATION (GENERAL & REGIONAL NERVE BLOCK)     Surgeon(s):  Carmen Yao MD     Assistant:  * No surgical staff found *     Anesthesia: General     Estimated Blood Loss (mL): less than 100      Complications: None     Specimens:   * No specimens in log *     Implants:  Implant Name Type Inv. Item Serial No.  Lot No. LRB No. Used Action   SCREW BNE L42MM DIA4MM PAT MORENO BLNT TIP LO PROF FOR FRAC - SNA   SCREW BNE L42MM DIA4MM PAT MORENO BLNT TIP LO PROF FOR FRAC NA [employer]WD NA Right 1 Implanted   SCREW BNE L40MM DIA4MM PAT MORENO BLNT TIP LO PROF FOR FRAC - SNA   SCREW BNE L40MM DIA4MM PAT MORENO BLNT TIP LO PROF FOR FRAC NA [employer] NA Right 1 Implanted          Drains: * No LDAs found *     Findings:  Infection Present At Time Of Surgery (PATOS) (choose all levels that have infection present):  No infection present  Other Findings: Right comminuted transverse patella fracture        Indications for the procedure: The patient has a right comminuted transverse patella fracture after a fall playing volleyball. Given the amount of displacement and step-off of the joint service would recommend surgery with right patella open reduction internal fixation. The risk, benefits, and alternatives of procedure were explained to the patient including the risk of infection, bleeding, damage to surrounding nerves, vessels, tendons, need for further procedures, chronic pain, wound healing issues, stiffness, blood clots, nonunion, malunion, posttraumatic osteoarthritis. Written informed consent was obtained.     Surgery: The patient was met in the preop holding area.  Correct patient was identified using

## 2025-02-06 NOTE — DISCHARGE INSTRUCTIONS
Orthopaedic Surgery Service  Sports Medicine  HOME DISCHARGE INSTRUCTIONS FOR:    Patella open reduction internal fixation   [facility] clinic: [phone]    ICE:  Use it as often as you can for the next 7 to 10 days. Ice bags/packs/bladder should be used for 20 to 30 min every 3 to 4 hrs during waking hours (minimum of 8 hrs/day). Be sure to protect your skin from frostbite with a washcloth, towel, or ace wrap between the ice bag/pack and your skin.    ELEVATION:  Keep your leg elevated whenever possible. The primary goal during the first week post-op is to minimize swelling in your knee, therefore it is beneficial to minimize ambulation for the first week. When sitting or laying down, try to have your knee elevated higher than the level of your heart. Place pillows or rolled sheets underneath your lower leg or heel, never underneath your knee. Placing pillows directly under your knee may be more comfortable, but this will cause your knee to remain in a flexed position, and it will become increasingly difficult to extend your knee.    DRESSING:  You may change the dressing on your knee 3 days after surgery. Replacing the ace wrap over the knee will help limit swelling.  There is a silver dressing over the incision that can stay in place for 7 days and is waterproof.  The incision was closed with absorbable sutures and Steri-Strips were applied over top. If steri-strips were applied, leave them on until they come off on their own (about 10-14 days). Sutures, if any, will be removed at your first post-op visit.    BATHING: You should keep your incision dry (no shower or bath) until 3 days after surgery at which time you may begin to shower. Do not bathe, soak the knee, or use hot tubs for 6 weeks. After your wound has been checked at your first post-op appointment you will be told when you may begin bathing/soaking.    BRACE:  You will be provided with a knee immobilizer or a hinged knee brace which is to be worn while

## 2025-02-06 NOTE — BRIEF OP NOTE
Brief Postoperative Note      Patient: Jessa James  YOB: 1950  MRN: [id number]    Date of Procedure: 2/6/2025    Pre-Op Diagnosis Codes:      * Closed displaced transverse fracture of right patella, initial encounter [S82.031A]    Post-Op Diagnosis: Same       Procedure(s):  RIGHT PATELLA OPEN REDUCTION INTERNAL FIXATION (GENERAL & REGIONAL NERVE BLOCK)    Surgeon(s):  Carmen Yao MD    Assistant:  * No surgical staff found *    Anesthesia: General    Estimated Blood Loss (mL): less than 100     Complications: None    Specimens:   * No specimens in log *    Implants:  Implant Name Type Inv. Item Serial No.  Lot No. LRB No. Used Action   SCREW BNE L42MM DIA4MM PAT MORENO BLNT TIP LO PROF FOR FRAC - SNA  SCREW BNE L42MM DIA4MM PAT MORENO BLNT TIP LO PROF FOR FRAC NA [employer] NA Right 1 Implanted   SCREW BNE L40MM DIA4MM PAT MORENO BLNT TIP LO PROF FOR FRAC - SNA  SCREW BNE L40MM DIA4MM PAT MORENO BLNT TIP LO PROF FOR FRAC NA [employer] NA Right 1 Implanted         Drains: * No LDAs found *    Findings:  Infection Present At Time Of Surgery (PATOS) (choose all levels that have infection present):  No infection present  Other Findings: Right comminuted transverse patella fracture      Indications for the procedure: The patient has a right comminuted transverse patella fracture after a fall playing volleyball. Given the amount of displacement and step-off of the joint service would recommend surgery with right patella open reduction internal fixation. The risk, benefits, and alternatives of procedure were explained to the patient including the risk of infection, bleeding, damage to surrounding nerves, vessels, tendons, need for further procedures, chronic pain, wound healing issues, stiffness, blood clots, nonunion, malunion, posttraumatic osteoarthritis. Written informed consent was obtained.     Surgery: The patient was met in the preop holding area.  Correct patient was

## 2025-02-06 NOTE — ANESTHESIA POSTPROCEDURE EVALUATION
Department of Anesthesiology  Postprocedure Note    Patient: Jessa James  MRN: [id number]  YOB: 1950  Date of evaluation: 2/6/2025    Procedure Summary       Date: 02/06/25 Room / Location: [facility] MAIN OR 08 / [facility] MAIN OR    Anesthesia Start: 0845 Anesthesia Stop: 1023    Procedure: RIGHT PATELLA OPEN REDUCTION INTERNAL FIXATION (GENERAL & REGIONAL NERVE BLOCK) (Right: Knee) Diagnosis:       Closed displaced transverse fracture of right patella, initial encounter      (Closed displaced transverse fracture of right patella, initial encounter [S82.031A])    Surgeons: Carmen Yao MD Responsible Provider: Chidi Santiago MD    Anesthesia Type: General ASA Status: 2            Anesthesia Type: General    Jorge Alberto Phase I: Jorge Alberto Score: 10    Jorge Alberto Phase II:      Anesthesia Post Evaluation    Patient location during evaluation: PACU  Patient participation: complete - patient participated  Level of consciousness: sleepy but conscious  Pain score: 0  Airway patency: patent  Nausea & Vomiting: no nausea and no vomiting  Cardiovascular status: hemodynamically stable  Respiratory status: acceptable  Hydration status: stable  Multimodal analgesia pain management approach    No notable events documented.

## 2025-02-06 NOTE — ANESTHESIA PROCEDURE NOTES
Peripheral Block    Patient location during procedure: procedure area  Reason for block: post-op pain management and at surgeon's request  Start time: 2/6/2025 8:30 AM  End time: 2/6/2025 8:33 AM  Staffing  Performed: anesthesiologist   Anesthesiologist: Chidi Santiago MD  Performed by: Chidi Santiago MD  Authorized by: Chidi Santiago MD    Preanesthetic Checklist  Completed: patient identified, IV checked, site marked, risks and benefits discussed, surgical/procedural consents, equipment checked, pre-op evaluation, timeout performed, anesthesia consent given, oxygen available, monitors applied/VS acknowledged, fire risk safety assessment completed and verbalized and blood product R/B/A discussed and consented  Peripheral Block   Patient position: supine  Prep: ChloraPrep  Provider prep: mask and sterile gloves  Patient monitoring: cardiac monitor, continuous pulse ox, continuous capnometry, frequent blood pressure checks, IV access, oxygen and responsive to questions  Block type: Femoral  Femoral crease  Laterality: right  Injection technique: single-shot  Guidance: ultrasound guided    Needle   Needle type: insulated echogenic nerve stimulator needle   Needle gauge: 20 G  Needle localization: ultrasound guidance  Needle length: 10 cm  Assessment   Injection assessment: negative aspiration for heme, no paresthesia on injection, local visualized surrounding nerve on ultrasound and no intravascular symptoms  Paresthesia pain: none  Slow fractionated injection: yes  Hemodynamics: stable  Outcomes: uncomplicated and patient tolerated procedure well    Medications Administered  ropivacaine (NAROPIN) injection 0.5% - Perineural   30 mL - 2/6/2025 8:33:00 AM

## 2025-02-19 ENCOUNTER — OFFICE VISIT (OUTPATIENT)
Age: 75
End: 2025-02-19

## 2025-02-19 VITALS
HEART RATE: 66 BPM | DIASTOLIC BLOOD PRESSURE: 88 MMHG | SYSTOLIC BLOOD PRESSURE: 122 MMHG | WEIGHT: 135 LBS | OXYGEN SATURATION: 98 % | BODY MASS INDEX: 23.92 KG/M2 | HEIGHT: 63 IN

## 2025-02-19 DIAGNOSIS — S82.031D CLOSED DISPLACED TRANSVERSE FRACTURE OF RIGHT PATELLA WITH ROUTINE HEALING, SUBSEQUENT ENCOUNTER: Primary | ICD-10-CM

## 2025-02-19 PROCEDURE — 99024 POSTOP FOLLOW-UP VISIT: CPT | Performed by: ORTHOPAEDIC SURGERY

## 2025-02-19 ASSESSMENT — PATIENT HEALTH QUESTIONNAIRE - PHQ9
SUM OF ALL RESPONSES TO PHQ QUESTIONS 1-9: 0
1. LITTLE INTEREST OR PLEASURE IN DOING THINGS: NOT AT ALL
SUM OF ALL RESPONSES TO PHQ9 QUESTIONS 1 & 2: 0
SUM OF ALL RESPONSES TO PHQ QUESTIONS 1-9: 0
2. FEELING DOWN, DEPRESSED OR HOPELESS: NOT AT ALL

## 2025-02-19 NOTE — PROGRESS NOTES
is here for a follow up visit after undergoing Right Patella Open Reduction Internal Fixation (general & Regional Nerve Block) - Right on 2/6/2025.    Pain has been appropriate since surgery, no major medical complications since surgery. Patient reports pain is controlled on OTC pain meds, oxycodone caused constipation.    The patient denies fevers, chills, nausea, vomiting, numbness/paresthesias.  The patient has been following the weight bearing precautions: NWB RLE in TROM brace.  The patient has not started physical therapy.     Current Outpatient Medications on File Prior to Visit   Medication Sig Dispense Refill    Multiple Vitamins-Minerals (PRESERVISION AREDS) CAPS Take by mouth      sennosides-docusate sodium (SENOKOT-S) 8.6-50 MG tablet Take 1 tablet by mouth 2 times daily as needed for Constipation 30 tablet 0    ondansetron (ZOFRAN-ODT) 4 MG disintegrating tablet Take 1 tablet by mouth every 8 hours as needed for Nausea or Vomiting 10 tablet 1    aspirin 81 MG EC tablet Take 1 tablet by mouth in the morning and at bedtime Take for 30 days to reduce risk of blood clots 60 tablet 0    ibuprofen (ADVIL;MOTRIN) 600 MG tablet Take 1 tablet by mouth 3 times daily as needed for Pain 90 tablet 2     No current facility-administered medications on file prior to visit.       ROS:  General: denies agitation, fevers/chills  Cardiac: denies major chest pain  Gastrointestinal: denies nausea/vomiting  Neurologic: Denies numbness/paresthesias  Skin: Denies erythema, warmth to touch, active drainage  Musculoskeletal: Endorses appropriate pain at surgical site      Physical Examination:    There were no vitals taken for this visit.    GENERAL: Patient is a healthy-appearing and in no apparent distress. Afebrile, normotensive, regular rate  MENTAL STATUS: Alert and oriented to time, place, person; mood and affect normal.  PULMONARY: Respiratory rate normal and non-labored on room air.  GASTROINTESTINAL:

## 2025-02-19 NOTE — PROGRESS NOTES
Identified pt with two pt identifiers (name and ). Reviewed chart in preparation for visit and have obtained necessary documentation.    Jessa James is a 74 y.o. female Post-Op Check (S/P Right Patella Fx Sx 2025)  .    Vitals:    25 1132   BP: 122/88   Site: Left Upper Arm   Position: Sitting   Cuff Size: Small Adult   Pulse: 66   SpO2: 98%   Weight: 61.2 kg (135 lb)   Height: 1.6 m (5' 2.99\")          1. Have you been to the ER, urgent care clinic since your last visit?  Hospitalized since your last visit?  no     2. Have you seen or consulted any other health care providers outside of the [facility] since your last visit?  Include any pap smears or colon screening.  no

## 2025-02-27 ENCOUNTER — HOSPITAL ENCOUNTER (OUTPATIENT)
Facility: HOSPITAL | Age: 75
Setting detail: RECURRING SERIES
End: 2025-02-27
Attending: ORTHOPAEDIC SURGERY
Payer: MEDICARE

## 2025-02-27 PROCEDURE — 97162 PT EVAL MOD COMPLEX 30 MIN: CPT

## 2025-02-27 PROCEDURE — 97110 THERAPEUTIC EXERCISES: CPT

## 2025-02-27 NOTE — THERAPY EVALUATION
[facility], [address]  [place] [address]  Ph: [phone]     Fax: [phone]       PHYSICAL THERAPY - MEDICARE EVALUATION/PLAN OF CARE NOTE (updated 3/23)      Date: 2025          Patient Name:  Jessa James :  1950   Medical   Diagnosis:  Closed displaced transverse fracture of right patella with routine healing, subsequent encounter [S82.605O] Treatment Diagnosis:  M25.561  RIGHT KNEE PAIN    Referral Source:  Carmen Yao* Provider #:  [id number]                Insurance: Payor: MEDICARE / Plan: MEDICARE PART A AND B / Product Type: *No Product type* /      Patient  verified yes     Visit #   Current  / Total 1 24-36   Time   In / Out 11:15am 12:20pm   Total Treatment Time 65   Total Timed Codes 10   1:1 Treatment Time 10      [facility] Totals Reminder:  bill using total billable   min of TIMED therapeutic procedures and modalities.   8-22 min = 1 unit; 23-37 min = 2 units; 38-52 min = 3 units;  53-67 min = 4 units; 68-82 min = 5 units       SUBJECTIVE  Pain Level (0-10 scale): 1/10  []constant []intermittent []improving []worsening [x]no change since onset    Any medication changes, allergies to medications, adverse drug reactions, diagnosis change, or new procedure performed?: [x] No    [] Yes (see summary sheet for update)  Medications: Verified on Patient Summary List    Subjective functional status/changes:    Pt suffered a fall at home on some concrete at the beginning of February.  She went by ambulance to [facility]. She had Right Patella Open Reduction Internal Fixation (general & Regional Nerve Block)2 days later on 25 by Dr. Yao.  She was seen by ortho for follow up visit and is wearing locked knee brace.  She has a rollator and crutches at home, but has only been in the .  She has not stood up very much.      Start of Care: 2025  Onset Date: 25  Current symptoms/Complaints: R knee pain appropriate

## 2025-03-03 ENCOUNTER — HOSPITAL ENCOUNTER (OUTPATIENT)
Facility: HOSPITAL | Age: 75
Setting detail: RECURRING SERIES
Discharge: HOME | End: 2025-03-06
Attending: ORTHOPAEDIC SURGERY
Payer: MEDICARE

## 2025-03-03 PROCEDURE — G0283 ELEC STIM OTHER THAN WOUND: HCPCS

## 2025-03-03 PROCEDURE — 97110 THERAPEUTIC EXERCISES: CPT

## 2025-03-03 NOTE — PROGRESS NOTES
PHYSICAL THERAPY - MEDICARE DAILY TREATMENT NOTE (updated 3/23)      Date: 3/3/2025          Patient Name:  Jessa James :  1950   Medical   Diagnosis:  Closed displaced transverse fracture of right patella with routine healing, subsequent encounter [S82.031D] Treatment Diagnosis:   M25.561  RIGHT KNEE PAIN    Referral Source:  Carmen Yao* Insurance:   Payor: MEDICARE / Plan: MEDICARE PART A AND B / Product Type: *No Product type* /                     Patient  verified yes     Visit #   Current  / Total 2 24-36   Time   In / Out 01:45 pm 02:53 pm   Total Treatment Time 60 min   Total Timed Codes 45 min   1:1 Treatment Time 45 min      [facility] Totals Reminder:  bill using total billable   min of TIMED therapeutic procedures and modalities.   8-22 min = 1 unit; 23-37 min = 2 units; 38-52 min = 3 units; 53-67 min = 4 units; 68-82 min = 5 units            SUBJECTIVE    Pain Level (0-10 scale): 0/10    Any medication changes, allergies to medications, adverse drug reactions, diagnosis change, or new procedure performed?: [x] No    [] Yes (see summary sheet for update)  Medications: Verified on Patient Summary List    Subjective functional status/changes:     Patient stated she does not have any pain. She was rolled in by her  on her Rollator. He  stated she can bend her leg to 90 when she is sitting in the chair. Patient also admitted she is walking a lot at home. \"I don't think it's 30% it's probably more.\"     OBJECTIVE      Therapeutic Procedures:  Tx Min Billable or 1:1 Min (if diff from Tx Min) Procedure, Rationale, Specifics   40  57588 Therapeutic Exercise (timed):  increase ROM, strength, coordination, balance, and proprioception to improve patient's ability to progress to PLOF and address remaining functional goals. (see flow sheet as applicable)     Details if applicable:     5  68024 Manual Therapy (timed):  decrease pain, increase ROM, and decrease trigger points to

## 2025-03-06 ENCOUNTER — HOSPITAL ENCOUNTER (OUTPATIENT)
Facility: HOSPITAL | Age: 75
Setting detail: RECURRING SERIES
Discharge: HOME | End: 2025-03-09
Attending: ORTHOPAEDIC SURGERY
Payer: MEDICARE

## 2025-03-06 PROCEDURE — 97140 MANUAL THERAPY 1/> REGIONS: CPT

## 2025-03-06 PROCEDURE — G0283 ELEC STIM OTHER THAN WOUND: HCPCS

## 2025-03-06 PROCEDURE — 97110 THERAPEUTIC EXERCISES: CPT

## 2025-03-06 NOTE — PROGRESS NOTES
Activity, 28029 Electrical Stim unattended /  ([facility]), 11892 Gait Training, and 69494 Ultrasound   Yes  Continue plan of care  Re-Cert Due: 2/27/25- 05/28/25  [x]  Upgrade activities as tolerated  []  Discharge due to:  []  Other:      ANN SNYDER, PTA       3/6/2025       3:38 PM

## 2025-03-11 ENCOUNTER — HOSPITAL ENCOUNTER (OUTPATIENT)
Facility: HOSPITAL | Age: 75
Setting detail: RECURRING SERIES
Discharge: HOME | End: 2025-03-14
Attending: ORTHOPAEDIC SURGERY
Payer: MEDICARE

## 2025-03-11 PROCEDURE — 97110 THERAPEUTIC EXERCISES: CPT

## 2025-03-11 PROCEDURE — G0283 ELEC STIM OTHER THAN WOUND: HCPCS

## 2025-03-11 NOTE — PROGRESS NOTES
PHYSICAL THERAPY - MEDICARE DAILY TREATMENT NOTE (updated 3/23)      Date: 3/11/2025          Patient Name:  Jessa James :  1950   Medical   Diagnosis:  Closed displaced transverse fracture of right patella with routine healing, subsequent encounter [S82.031D] Treatment Diagnosis:   M25.561  RIGHT KNEE PAIN    Referral Source:  Carmen Yao* Insurance:   Payor: MEDICARE / Plan: MEDICARE PART A AND B / Product Type: *No Product type* /                     Patient  verified yes     Visit #   Current  / Total 4 24-36   Time   In / Out 01:00 pm 02:10 pm   Total Treatment Time 60 min   Total Timed Codes 45 min   1:1 Treatment Time 45 min      [facility] Totals Reminder:  bill using total billable   min of TIMED therapeutic procedures and modalities.   8-22 min = 1 unit; 23-37 min = 2 units; 38-52 min = 3 units; 53-67 min = 4 units; 68-82 min = 5 units            SUBJECTIVE    Pain Level (0-10 scale): 4/10    Any medication changes, allergies to medications, adverse drug reactions, diagnosis change, or new procedure performed?: [x] No    [] Yes (see summary sheet for update)  Medications: Verified on Patient Summary List    Subjective functional status/changes:     Patient stated she does a lot at home. She is ambulating without an AD at home and FWB. She does have crutches at home and will start using 1 crutch with her gait. She states she elevates her R LE at night but will start doing it during the day as well. She is reporting stinging like pain across her knee.    OBJECTIVE      Therapeutic Procedures:  Tx Min Billable or 1:1 Min (if diff from Tx Min) Procedure, Rationale, Specifics   45  65345 Therapeutic Exercise (timed):  increase ROM, strength, coordination, balance, and proprioception to improve patient's ability to progress to PLOF and address remaining functional goals. (see flow sheet as applicable)     Details if applicable:       33473 Manual Therapy (timed):  decrease pain, increase

## 2025-03-14 ENCOUNTER — APPOINTMENT (OUTPATIENT)
Facility: HOSPITAL | Age: 75
End: 2025-03-14
Attending: ORTHOPAEDIC SURGERY
Payer: MEDICARE

## 2025-03-17 ENCOUNTER — HOSPITAL ENCOUNTER (OUTPATIENT)
Facility: HOSPITAL | Age: 75
Setting detail: RECURRING SERIES
Discharge: HOME | End: 2025-03-20
Attending: ORTHOPAEDIC SURGERY
Payer: MEDICARE

## 2025-03-17 PROCEDURE — G0283 ELEC STIM OTHER THAN WOUND: HCPCS

## 2025-03-17 PROCEDURE — 97110 THERAPEUTIC EXERCISES: CPT

## 2025-03-17 NOTE — PROGRESS NOTES
tolerated  []  Discharge due to:  []  Other:      ANN SNYDER, ALIS       3/17/2025       1:52 PM

## 2025-03-19 ENCOUNTER — OFFICE VISIT (OUTPATIENT)
Age: 75
End: 2025-03-19

## 2025-03-19 DIAGNOSIS — S82.031D CLOSED DISPLACED TRANSVERSE FRACTURE OF RIGHT PATELLA WITH ROUTINE HEALING, SUBSEQUENT ENCOUNTER: Primary | ICD-10-CM

## 2025-03-19 PROCEDURE — 99024 POSTOP FOLLOW-UP VISIT: CPT | Performed by: ORTHOPAEDIC SURGERY

## 2025-03-19 ASSESSMENT — PATIENT HEALTH QUESTIONNAIRE - PHQ9
SUM OF ALL RESPONSES TO PHQ QUESTIONS 1-9: 0
1. LITTLE INTEREST OR PLEASURE IN DOING THINGS: NOT AT ALL
2. FEELING DOWN, DEPRESSED OR HOPELESS: NOT AT ALL
SUM OF ALL RESPONSES TO PHQ QUESTIONS 1-9: 0

## 2025-03-19 NOTE — PROGRESS NOTES
Identified pt with two pt identifiers (name and ). Reviewed chart in preparation for visit and have obtained necessary documentation.     Jessa James is a 74 y.o. female Follow-up and Post-Op Check (Po right patella, does not feel she is healing well its hard for her to sleep from the pain , standing for to long causing a burning feeling about the patella, going to physical therapy twice a week thinks it is helping ,)  .           1. Have you been to the ER, urgent care clinic since your last visit?  Hospitalized since your last visit?  no    2. Have you seen or consulted any other health care providers outside of the [facility] since your last visit?  Include any pap smears or colon screening.  no

## 2025-03-19 NOTE — PROGRESS NOTES
is here for a follow up visit after undergoing Right Patella Open Reduction Internal Fixation (general & Regional Nerve Block) - Right on 2/6/2025.    Pain has been appropriate since surgery, no major medical complications since surgery. Patient reports pain is controlled on OTC pain meds.    The patient denies fevers, chills, nausea, vomiting, numbness/paresthesias.  The patient has been following the weight bearing precautions: WBAT RLE in TROM brace, sometimes she does not wear brace.  The patient has started physical therapy.     Current Outpatient Medications on File Prior to Visit   Medication Sig Dispense Refill    Multiple Vitamins-Minerals (PRESERVISION AREDS) CAPS Take by mouth      sennosides-docusate sodium (SENOKOT-S) 8.6-50 MG tablet Take 1 tablet by mouth 2 times daily as needed for Constipation 30 tablet 0    ondansetron (ZOFRAN-ODT) 4 MG disintegrating tablet Take 1 tablet by mouth every 8 hours as needed for Nausea or Vomiting 10 tablet 1    ibuprofen (ADVIL;MOTRIN) 600 MG tablet Take 1 tablet by mouth 3 times daily as needed for Pain 90 tablet 2    aspirin 81 MG EC tablet Take 1 tablet by mouth in the morning and at bedtime Take for 30 days to reduce risk of blood clots (Patient not taking: Reported on 3/19/2025) 60 tablet 0     No current facility-administered medications on file prior to visit.       ROS:  General: denies agitation, fevers/chills  Cardiac: denies major chest pain  Gastrointestinal: denies nausea/vomiting  Neurologic: Denies numbness/paresthesias  Skin: Denies erythema, warmth to touch, active drainage  Musculoskeletal: Endorses appropriate pain at surgical site      Physical Examination:    There were no vitals taken for this visit.    GENERAL: Patient is a healthy-appearing and in no apparent distress. Afebrile, normotensive, regular rate  MENTAL STATUS: Alert and oriented to time, place, person; mood and affect normal.  PULMONARY: Respiratory rate normal and

## 2025-03-20 ENCOUNTER — HOSPITAL ENCOUNTER (OUTPATIENT)
Facility: HOSPITAL | Age: 75
Setting detail: RECURRING SERIES
End: 2025-03-20
Attending: ORTHOPAEDIC SURGERY
Payer: MEDICARE

## 2025-03-24 ENCOUNTER — HOSPITAL ENCOUNTER (OUTPATIENT)
Facility: HOSPITAL | Age: 75
Setting detail: RECURRING SERIES
Discharge: HOME | End: 2025-03-27
Attending: ORTHOPAEDIC SURGERY
Payer: MEDICARE

## 2025-03-24 PROCEDURE — 97140 MANUAL THERAPY 1/> REGIONS: CPT

## 2025-03-24 PROCEDURE — 97112 NEUROMUSCULAR REEDUCATION: CPT

## 2025-03-24 PROCEDURE — 97110 THERAPEUTIC EXERCISES: CPT

## 2025-03-24 PROCEDURE — G0283 ELEC STIM OTHER THAN WOUND: HCPCS

## 2025-03-24 NOTE — PROGRESS NOTES
PHYSICAL THERAPY - MEDICARE DAILY TREATMENT NOTE (updated 3/23)      Date: 3/24/2025          Patient Name:  Jessa James :  1950   Medical   Diagnosis:  Closed displaced transverse fracture of right patella with routine healing, subsequent encounter [S82.031D] Treatment Diagnosis:   M25.561  RIGHT KNEE PAIN    Referral Source:  Carmen Yao* Insurance:   Payor: MEDICARE / Plan: MEDICARE PART A AND B / Product Type: *No Product type* /                     Patient  verified yes     Visit #   Current  / Total 6 24-36   Time   In / Out 1:02pm 2:05pm   Total Treatment Time 60 min   Total Timed Codes 45   1:1 Treatment Time 45      [facility] Totals Reminder:  bill using total billable   min of TIMED therapeutic procedures and modalities.   8-22 min = 1 unit; 23-37 min = 2 units; 38-52 min = 3 units; 53-67 min = 4 units; 68-82 min = 5 units            SUBJECTIVE    Pain Level (0-10 scale): 0/10    Any medication changes, allergies to medications, adverse drug reactions, diagnosis change, or new procedure performed?: [x] No    [] Yes (see summary sheet for update)  Medications: Verified on Patient Summary List    Subjective functional status/changes:     Patient has no pain, but feels \"tingling\" that sometimes moves up her leg.  She talked to Dr. Yao about this and felt that it may be related to the nerve block and just \"take time to heal\".  She also said that the pt could come to PT 1x a week if she feels like she is making progress with HEP.  She was fitted for a new brace and had this on appropriately today.      OBJECTIVE      Therapeutic Procedures:  Tx Min Billable or 1:1 Min (if diff from Tx Min) Procedure, Rationale, Specifics   25  16641 Therapeutic Exercise (timed):  increase ROM, strength, coordination, balance, and proprioception to improve patient's ability to progress to PLOF and address remaining functional goals. (see flow sheet as applicable)     Details if applicable:     10

## 2025-03-27 ENCOUNTER — HOSPITAL ENCOUNTER (OUTPATIENT)
Facility: HOSPITAL | Age: 75
Setting detail: RECURRING SERIES
Discharge: HOME | End: 2025-03-30
Attending: ORTHOPAEDIC SURGERY
Payer: MEDICARE

## 2025-03-27 PROCEDURE — 97140 MANUAL THERAPY 1/> REGIONS: CPT

## 2025-03-27 PROCEDURE — 97110 THERAPEUTIC EXERCISES: CPT

## 2025-03-27 PROCEDURE — 97112 NEUROMUSCULAR REEDUCATION: CPT

## 2025-03-27 NOTE — PROGRESS NOTES
[facility] [address], [address]  [place] [address]  Ph: [phone]     Fax: [phone]    PHYSICAL THERAPY PROGRESS NOTE  Patient Name:  Jessa James :  1950   Treatment/Medical Diagnosis: Closed displaced transverse fracture of right patella with routine healing, subsequent encounter [C29.914C]   Referral Source:  Carmen Yao*     Date of Initial Visit:  25 Attended Visits:  7 Missed Visits:  0     SUMMARY OF TREATMENT/ASSESSMENT:  Pt has done very well and recently had ortho f/u with Dr. Yao.  Patient still battling significant gastroc weakness on involved side with \"50% weaker\" self reporting and visible quad atrophy as compared to opp side.  She continues with warmth, redness, and swelling, but this does respond to MT.  She is eager to progress and feels comfortable with her ADL's at home, but also realizes her gait is affected significantly due to the weakness. She is unable to go up/down steps in a normal pattern, but is progressing as tolerated and appropriate. She is wearing the new brace appropriately.  Per MD, she may drop to 1x a week and prepare for d/c.  I did encourage her to continue therapy until the gastroc  and quad are stronger so she can perform a SHR  and ascend/descend stairs better to improve her gait and diminish limp and pressure on other joints. Many goals met or partially met to date.    CURRENT STATUS/GOALS:    Objective/Functional Outcome Measure: AM-PAC score = an established functional score where 0% = no disability                                                                          INTIAL AM-PAC: 53.6%;  3/27/25: 32.8%     Pt Goals: \"Get back to my normal routine\"     [] Met [] Not met [x] Partially met  Date:3/17 with brace on     Short Term Goals: To be accomplished in 6-8 treatments.  Pt will be independent with HEP to promote progression of therapy services.      [x] Met [] Not met []

## 2025-03-27 NOTE — PROGRESS NOTES
PHYSICAL THERAPY - MEDICARE DAILY TREATMENT NOTE (updated 3/23)      Date: 3/27/2025          Patient Name:  Jessa James :  1950   Medical   Diagnosis:  Closed displaced transverse fracture of right patella with routine healing, subsequent encounter [S82.031D] Treatment Diagnosis:   M25.561  RIGHT KNEE PAIN    Referral Source:  Carmen Yao* Insurance:   Payor: MEDICARE / Plan: MEDICARE PART A AND B / Product Type: *No Product type* /                     Patient  verified yes     Visit #   Current  / Total 7 24-36   Time   In / Out 1:47pm 2:48pm   Total Treatment Time 55   Total Timed Codes 55   1:1 Treatment Time 55      [facility] Totals Reminder:  bill using total billable   min of TIMED therapeutic procedures and modalities.   8-22 min = 1 unit; 23-37 min = 2 units; 38-52 min = 3 units; 53-67 min = 4 units; 68-82 min = 5 units            SUBJECTIVE    Pain Level (0-10 scale):  0/10    Any medication changes, allergies to medications, adverse drug reactions, diagnosis change, or new procedure performed?: [x] No    [] Yes (see summary sheet for update)  Medications: Verified on Patient Summary List    Subjective functional status/changes:     Patient reported that she felt \"really good\" after the last session and sees improvement.  \"I liked the bands\".   She also thinks the heat helped.      OBJECTIVE      Therapeutic Procedures:  Tx Min Billable or 1:1 Min (if diff from Tx Min) Procedure, Rationale, Specifics   35  [id number] Therapeutic Exercise (timed):  increase ROM, strength, coordination, balance, and proprioception to improve patient's ability to progress to PLOF and address remaining functional goals. (see flow sheet as applicable)     Details if applicable:     10  [id number] Manual Therapy (timed):  decrease pain, increase ROM, and decrease trigger points to improve patient's ability to progress to PLOF and address remaining functional goals.  The manual therapy interventions were performed

## 2025-04-02 ENCOUNTER — HOSPITAL ENCOUNTER (OUTPATIENT)
Facility: HOSPITAL | Age: 75
Setting detail: RECURRING SERIES
Discharge: HOME | End: 2025-04-05
Attending: ORTHOPAEDIC SURGERY
Payer: MEDICARE

## 2025-04-02 PROCEDURE — 97116 GAIT TRAINING THERAPY: CPT

## 2025-04-02 PROCEDURE — 97110 THERAPEUTIC EXERCISES: CPT

## 2025-04-02 PROCEDURE — 97112 NEUROMUSCULAR REEDUCATION: CPT

## 2025-04-02 NOTE — PROGRESS NOTES
[x] Met [] Not met [] Partially met Date:  3/27: 32.8%     Pt can sleep comfortably for at least 6 hours without waking in pain.      [] Met [] Not met [x] Partially met Date:  3/24-still sleeps in recliner     Pt can perform 5 repetitions in 14 seconds with 0 HHA for a STS test to aide with getting up after sitting for >/= 30 minutes without difficulty/use of hands, stiffness, or stumbling.         [] Met [] Not met [x] Partially met Date:  3/27-15 sec with no HHA     Pt performs 6 MWT for 1,000 feet safely without verbal or increased pain so they can ambulate community distances without knee pain or fear of falling, to get groceries, medications.       [x] Met [] Not met [] Partially met Date: 3/27: 2' with no AD, brace on     Pt can go up and down steps with 0-1 support in a normal pattern without difficulty, fatigue, or pain > 2/10 for safe gait.      [] Met [] Not met [x] Partially met Date:  3/27: pt able to ascend steps with 1 hand rail in normal pattern, but step to for down steps-both hands on rail     Pt will have knee flexion ROM sufficient to be able to squat to retrieve item from ground without increase of pain.      [] Met [] Not met [x] Partially met Date:  3/27-pt able to do small mini- squat without pain wearing brace     Pt will have decreased swelling by 1/2 cm to improve knee flexion for comfortable sitting and less pain and stiffness.      [x] Met [] Not met [] Partially met Date: 3/27     Pt will have quad strength sufficient to perform SLS for 10-15 seconds for safe stance phase of gait so patient can walk community distances without buckling or weakness.      [] Met [] Not met [x] Partially met Date:  3/24-able to hold SLS for 30 sec with 2 hha and her new brace on.      PLAN  Frequency / Duration: Patient to be seen 2-3 times per week for 24-36 treatments.  Treatment Plan may include any combination of the followin Therapeutic Exercise, 18655 Neuromuscular Re-Education,

## 2025-04-09 ENCOUNTER — HOSPITAL ENCOUNTER (OUTPATIENT)
Facility: HOSPITAL | Age: 75
Setting detail: RECURRING SERIES
Discharge: HOME | End: 2025-04-12
Attending: ORTHOPAEDIC SURGERY
Payer: MEDICARE

## 2025-04-09 PROCEDURE — 97110 THERAPEUTIC EXERCISES: CPT

## 2025-04-09 PROCEDURE — 97112 NEUROMUSCULAR REEDUCATION: CPT

## 2025-04-09 NOTE — PROGRESS NOTES
PHYSICAL THERAPY - MEDICARE DAILY TREATMENT NOTE (updated 3/23)      Date: 2025          Patient Name:  Jessa James :  1950   Medical   Diagnosis:  Closed displaced transverse fracture of right patella with routine healing, subsequent encounter [S82.031D] Treatment Diagnosis:   M25.561  RIGHT KNEE PAIN    Referral Source:  Carmen Yao* Insurance:   Payor: MEDICARE / Plan: MEDICARE PART A AND B / Product Type: *No Product type* /                     Patient  verified yes     Visit #   Current  / Total 9 24-36   Time   In / Out 9:55am 10:55am   Total Treatment Time 54   Total Timed Codes 54   1:1 Treatment Time 54      [facility] Totals Reminder:  bill using total billable   min of TIMED therapeutic procedures and modalities.   8-22 min = 1 unit; 23-37 min = 2 units; 38-52 min = 3 units; 53-67 min = 4 units; 68-82 min = 5 units            SUBJECTIVE    Pain Level (0-10 scale):  0/10    Any medication changes, allergies to medications, adverse drug reactions, diagnosis change, or new procedure performed?: [x] No    [] Yes (see summary sheet for update)  Medications: Verified on Patient Summary List    Subjective functional status/changes:     Patient was \"a little sore after the last session, but nothing too bad\".  She made it to the [facility], but did use caution on the steps.  \"I just did one at a time and held on\".  She had some mild pain after this but it has lessened.  She continues her HEP.      OBJECTIVE      Therapeutic Procedures:  Tx Min Billable or 1:1 Min (if diff from Tx Min) Procedure, Rationale, Specifics   40  [id number] Therapeutic Exercise (timed):  increase ROM, strength, coordination, balance, and proprioception to improve patient's ability to progress to PLOF and address remaining functional goals. (see flow sheet as applicable)     Details if applicable:     4  [id number] Manual Therapy (timed):  decrease pain, increase ROM, and decrease trigger points to improve patient's

## 2025-04-21 ENCOUNTER — OFFICE VISIT (OUTPATIENT)
Age: 75
End: 2025-04-21

## 2025-04-21 DIAGNOSIS — S82.031D CLOSED DISPLACED TRANSVERSE FRACTURE OF RIGHT PATELLA WITH ROUTINE HEALING, SUBSEQUENT ENCOUNTER: Primary | ICD-10-CM

## 2025-04-21 PROCEDURE — 99024 POSTOP FOLLOW-UP VISIT: CPT | Performed by: ORTHOPAEDIC SURGERY

## 2025-04-21 ASSESSMENT — PATIENT HEALTH QUESTIONNAIRE - PHQ9
SUM OF ALL RESPONSES TO PHQ QUESTIONS 1-9: 0
1. LITTLE INTEREST OR PLEASURE IN DOING THINGS: NOT AT ALL
SUM OF ALL RESPONSES TO PHQ QUESTIONS 1-9: 0
SUM OF ALL RESPONSES TO PHQ QUESTIONS 1-9: 0
2. FEELING DOWN, DEPRESSED OR HOPELESS: NOT AT ALL
SUM OF ALL RESPONSES TO PHQ QUESTIONS 1-9: 0

## 2025-04-21 NOTE — PROGRESS NOTES
Identified pt with two pt identifiers (name and ). Reviewed chart in preparation for visit and have obtained necessary documentation.    Jessa James is a 74 y.o. female Post-Op Check (S/P Right patella Fx Sx 2025- Patient says it is a little stiff in the mornings and has finished PT. )        1. Have you been to the ER, urgent care clinic since your last visit?  Hospitalized since your last visit?  no     2. Have you seen or consulted any other health care providers [facility] since your last visit?  Include any pap smears or colon screening.  no

## 2025-04-21 NOTE — PROGRESS NOTES
is here for a follow up visit after undergoing Right Patella Open Reduction Internal Fixation (general & Regional Nerve Block) - Right on 2/6/2025.    Pain has been appropriate since surgery, no major medical complications since surgery. Patient reports pain is controlled on OTC pain meds.    The patient denies fevers, chills, nausea, vomiting, numbness/paresthesias.  The patient has been following the weight bearing precautions: WBAT RLE in hinged knee brace, sometimes she does not wear brace and reports no issues.  The patient is doing physical therapy.     Current Outpatient Medications on File Prior to Visit   Medication Sig Dispense Refill    Multiple Vitamins-Minerals (PRESERVISION AREDS) CAPS Take by mouth      sennosides-docusate sodium (SENOKOT-S) 8.6-50 MG tablet Take 1 tablet by mouth 2 times daily as needed for Constipation (Patient not taking: Reported on 4/21/2025) 30 tablet 0    ondansetron (ZOFRAN-ODT) 4 MG disintegrating tablet Take 1 tablet by mouth every 8 hours as needed for Nausea or Vomiting (Patient not taking: Reported on 4/21/2025) 10 tablet 1    aspirin 81 MG EC tablet Take 1 tablet by mouth in the morning and at bedtime Take for 30 days to reduce risk of blood clots (Patient not taking: Reported on 4/21/2025) 60 tablet 0    ibuprofen (ADVIL;MOTRIN) 600 MG tablet Take 1 tablet by mouth 3 times daily as needed for Pain (Patient not taking: Reported on 4/21/2025) 90 tablet 2     No current facility-administered medications on file prior to visit.       ROS:  General: denies agitation, fevers/chills  Cardiac: denies major chest pain  Gastrointestinal: denies nausea/vomiting  Neurologic: Denies numbness/paresthesias  Skin: Denies erythema, warmth to touch, active drainage  Musculoskeletal: Endorses appropriate pain at surgical site      Physical Examination:    There were no vitals taken for this visit.    GENERAL: Patient is a healthy-appearing and in no apparent distress.

## 2025-06-05 ENCOUNTER — OFFICE VISIT (OUTPATIENT)
Age: 75
End: 2025-06-05
Payer: MEDICARE

## 2025-06-05 DIAGNOSIS — S82.031D CLOSED DISPLACED TRANSVERSE FRACTURE OF RIGHT PATELLA WITH ROUTINE HEALING, SUBSEQUENT ENCOUNTER: Primary | ICD-10-CM

## 2025-06-05 PROCEDURE — 1159F MED LIST DOCD IN RCRD: CPT | Performed by: STUDENT IN AN ORGANIZED HEALTH CARE EDUCATION/TRAINING PROGRAM

## 2025-06-05 PROCEDURE — G8399 PT W/DXA RESULTS DOCUMENT: HCPCS | Performed by: STUDENT IN AN ORGANIZED HEALTH CARE EDUCATION/TRAINING PROGRAM

## 2025-06-05 PROCEDURE — 3017F COLORECTAL CA SCREEN DOC REV: CPT | Performed by: STUDENT IN AN ORGANIZED HEALTH CARE EDUCATION/TRAINING PROGRAM

## 2025-06-05 PROCEDURE — G8420 CALC BMI NORM PARAMETERS: HCPCS | Performed by: STUDENT IN AN ORGANIZED HEALTH CARE EDUCATION/TRAINING PROGRAM

## 2025-06-05 PROCEDURE — 1123F ACP DISCUSS/DSCN MKR DOCD: CPT | Performed by: STUDENT IN AN ORGANIZED HEALTH CARE EDUCATION/TRAINING PROGRAM

## 2025-06-05 PROCEDURE — 99213 OFFICE O/P EST LOW 20 MIN: CPT | Performed by: STUDENT IN AN ORGANIZED HEALTH CARE EDUCATION/TRAINING PROGRAM

## 2025-06-05 PROCEDURE — G8427 DOCREV CUR MEDS BY ELIG CLIN: HCPCS | Performed by: STUDENT IN AN ORGANIZED HEALTH CARE EDUCATION/TRAINING PROGRAM

## 2025-06-05 PROCEDURE — 1036F TOBACCO NON-USER: CPT | Performed by: STUDENT IN AN ORGANIZED HEALTH CARE EDUCATION/TRAINING PROGRAM

## 2025-06-05 PROCEDURE — 1090F PRES/ABSN URINE INCON ASSESS: CPT | Performed by: STUDENT IN AN ORGANIZED HEALTH CARE EDUCATION/TRAINING PROGRAM

## 2025-06-05 ASSESSMENT — PATIENT HEALTH QUESTIONNAIRE - PHQ9
SUM OF ALL RESPONSES TO PHQ QUESTIONS 1-9: 0
SUM OF ALL RESPONSES TO PHQ QUESTIONS 1-9: 0
2. FEELING DOWN, DEPRESSED OR HOPELESS: NOT AT ALL
SUM OF ALL RESPONSES TO PHQ QUESTIONS 1-9: 0
1. LITTLE INTEREST OR PLEASURE IN DOING THINGS: NOT AT ALL
SUM OF ALL RESPONSES TO PHQ QUESTIONS 1-9: 0

## 2025-06-05 NOTE — PROGRESS NOTES
Jessa James (:  1950) is a 74 y.o. female,Established patient, here for evaluation of the following chief complaint(s):  Follow-up (Right patella - not having any pain , she has a concern about it not healing well previous appt her screw in her knee was causing some issues)         Assessment & Plan  Closed displaced transverse fracture of right patella with routine healing, subsequent encounter   - Reports no pain but experiences occasional stiffness, which is expected due to the nature of the injury and the potential development of arthritis.  - Continues to perform exercises independently and has no difficulty walking up and down stairs.  - Noted continued improvement, but advised discussing possibility osteoporosis screening with patient given healing concerns that she has and the pace of bone callous formation.    Orders:    XR KNEE RIGHT (3 VIEWS); Future          No follow-ups on file.       Subjective   HPI  History of Present Illness  The patient presents for evaluation of a patellar fracture.    She reports a satisfactory recovery process, with no significant pain experienced. However, she notes occasional stiffness in the affected area. Despite discontinuing formal physical therapy, she maintains an active lifestyle through regular exercise. She does not experience any difficulty in ascending or descending stairs.    Review of Systems       Objective   Physical Exam  Musculoskeletal:      Right knee: No swelling or bony tenderness.      Left knee: No swelling or bony tenderness.      Comments: Noted mild stiffness on ROM R>L       Physical Exam  Extremities: No pain, mild stiffness noted         Results        The patient (or guardian, if applicable) and other individuals in attendance with the patient were advised that Artificial Intelligence will be utilized during this visit to record, process the conversation to generate a clinical note, and support improvement of the AI technology.

## 2025-06-05 NOTE — ASSESSMENT & PLAN NOTE
- Reports no pain but experiences occasional stiffness, which is expected due to the nature of the injury and the potential development of arthritis.  - Continues to perform exercises independently and has no difficulty walking up and down stairs.  - Noted continued improvement, but advised discussing possibility osteoporosis screening with patient given healing concerns that she has and the pace of bone callous formation.    Orders:    XR KNEE RIGHT (3 VIEWS); Future

## 2025-06-05 NOTE — PROGRESS NOTES
Identified pt with two pt identifiers (name and ). Reviewed chart in preparation for visit and have obtained necessary documentation.     Jessa James is a 74 y.o. female Follow-up (Right patella - not having any pain , she has a concern about it not healing well previous appt her screw in her knee was causing some issues)  .        1. Have you been to the ER, urgent care clinic since your last visit?  Hospitalized since your last visit?  no    2. Have you seen or consulted any other health care providers outside of the [facility] since your last visit?  Include any pap smears or colon screening.  no

## 2025-07-30 ENCOUNTER — OFFICE VISIT (OUTPATIENT)
Age: 75
End: 2025-07-30
Payer: MEDICARE

## 2025-07-30 DIAGNOSIS — S82.031D CLOSED DISPLACED TRANSVERSE FRACTURE OF RIGHT PATELLA WITH ROUTINE HEALING, SUBSEQUENT ENCOUNTER: Primary | ICD-10-CM

## 2025-07-30 PROCEDURE — G8427 DOCREV CUR MEDS BY ELIG CLIN: HCPCS | Performed by: ORTHOPAEDIC SURGERY

## 2025-07-30 PROCEDURE — 1036F TOBACCO NON-USER: CPT | Performed by: ORTHOPAEDIC SURGERY

## 2025-07-30 PROCEDURE — G8420 CALC BMI NORM PARAMETERS: HCPCS | Performed by: ORTHOPAEDIC SURGERY

## 2025-07-30 PROCEDURE — 3017F COLORECTAL CA SCREEN DOC REV: CPT | Performed by: ORTHOPAEDIC SURGERY

## 2025-07-30 PROCEDURE — 1090F PRES/ABSN URINE INCON ASSESS: CPT | Performed by: ORTHOPAEDIC SURGERY

## 2025-07-30 PROCEDURE — 1123F ACP DISCUSS/DSCN MKR DOCD: CPT | Performed by: ORTHOPAEDIC SURGERY

## 2025-07-30 PROCEDURE — G8399 PT W/DXA RESULTS DOCUMENT: HCPCS | Performed by: ORTHOPAEDIC SURGERY

## 2025-07-30 PROCEDURE — 1160F RVW MEDS BY RX/DR IN RCRD: CPT | Performed by: ORTHOPAEDIC SURGERY

## 2025-07-30 PROCEDURE — 1159F MED LIST DOCD IN RCRD: CPT | Performed by: ORTHOPAEDIC SURGERY

## 2025-07-30 PROCEDURE — 99213 OFFICE O/P EST LOW 20 MIN: CPT | Performed by: ORTHOPAEDIC SURGERY

## 2025-07-30 ASSESSMENT — PATIENT HEALTH QUESTIONNAIRE - PHQ9
SUM OF ALL RESPONSES TO PHQ QUESTIONS 1-9: 0
SUM OF ALL RESPONSES TO PHQ QUESTIONS 1-9: 0
1. LITTLE INTEREST OR PLEASURE IN DOING THINGS: NOT AT ALL
SUM OF ALL RESPONSES TO PHQ QUESTIONS 1-9: 0
2. FEELING DOWN, DEPRESSED OR HOPELESS: NOT AT ALL
SUM OF ALL RESPONSES TO PHQ QUESTIONS 1-9: 0

## 2025-07-30 NOTE — PROGRESS NOTES
is here for a follow up visit after undergoing Right Patella Open Reduction Internal Fixation (general & Regional Nerve Block) - Right on 2/6/2025.    Pain has been appropriate since surgery, no major medical complications since surgery. Patient reports pain is controlled on OTC pain meds.    The patient denies fevers, chills, nausea, vomiting, numbness/paresthesias.  The patient has been following the weight bearing precautions: WBAT RLE.  The patient completed physical therapy.  She reports no limitations in her activities.    Current Outpatient Medications on File Prior to Visit   Medication Sig Dispense Refill    Multiple Vitamins-Minerals (PRESERVISION AREDS) CAPS Take by mouth      sennosides-docusate sodium (SENOKOT-S) 8.6-50 MG tablet Take 1 tablet by mouth 2 times daily as needed for Constipation (Patient not taking: Reported on 4/21/2025) 30 tablet 0    ondansetron (ZOFRAN-ODT) 4 MG disintegrating tablet Take 1 tablet by mouth every 8 hours as needed for Nausea or Vomiting (Patient not taking: Reported on 4/21/2025) 10 tablet 1    aspirin 81 MG EC tablet Take 1 tablet by mouth in the morning and at bedtime Take for 30 days to reduce risk of blood clots (Patient not taking: Reported on 7/30/2025) 60 tablet 0    ibuprofen (ADVIL;MOTRIN) 600 MG tablet Take 1 tablet by mouth 3 times daily as needed for Pain (Patient not taking: Reported on 4/21/2025) 90 tablet 2     No current facility-administered medications on file prior to visit.       ROS:  General: denies agitation, fevers/chills  Cardiac: denies major chest pain  Gastrointestinal: denies nausea/vomiting  Neurologic: Denies numbness/paresthesias  Skin: Denies erythema, warmth to touch, active drainage  Musculoskeletal: Endorses appropriate pain at surgical site      Physical Examination:    There were no vitals taken for this visit.    GENERAL: Patient is a healthy-appearing and in no apparent distress. Afebrile, normotensive, regular

## 2025-07-30 NOTE — PROGRESS NOTES
Identified pt with two pt identifiers (name and ). Reviewed chart in preparation for visit and have obtained necessary documentation.    Jessa James is a 74 y.o. female Follow-up (Closed displaced transverse fracture of right patella)      1. Have you been to the ER, urgent care clinic since your last visit?  Hospitalized since your last visit?  no     2. Have you seen or consulted any other health care providers outside of the [facility] since your last visit?  Include any pap smears or colon screening.  no

## (undated) DEVICE — INTENDED FOR TISSUE SEPARATION, AND OTHER PROCEDURES THAT REQUIRE A SHARP SURGICAL BLADE TO PUNCTURE OR CUT.: Brand: BARD-PARKER ® CARBON RIB-BACK BLADES

## (undated) DEVICE — BANDAGE COMPR W6INXL10YD ST M E WHITE/BEIGE

## (undated) DEVICE — TAPE FIBER CARCLAGE W/ TIGER LINK (ORDER MUTLIPLES OF 5 EACH)

## (undated) DEVICE — BRACE KNEE AD ALUM FOAM FULL UNIV HNG STRP CLSR ADJ X-ACT

## (undated) DEVICE — GLOVE SURG SZ 65 L12IN FNGR THK79MIL GRN LTX FREE

## (undated) DEVICE — BANDAGE COMPR W6INXL5YD WHT BGE POLY COT M E WRP WV HK AND

## (undated) DEVICE — PROTECTOR ULN NRV PUR FOAM HK LOOP STRP ANATOMICALLY

## (undated) DEVICE — PADDING CAST W6INXL4YD NONSTERILE COT RAYON MICROPLEATED

## (undated) DEVICE — GOWN SURG LG 43 IN AAMI LEVEL 3 ORBIS

## (undated) DEVICE — C-ARMOR C-ARM EQUIPMENT COVERS CLEAR STERILE UNIVERSAL FIT 12 PER CASE: Brand: C-ARMOR

## (undated) DEVICE — ZIMMER® STERILE DISPOSABLE TOURNIQUET CUFF WITH PLC, DUAL PORT, SINGLE BLADDER, 24 IN. (61 CM)

## (undated) DEVICE — BASIC SINGLE BASIN-LF: Brand: MEDLINE INDUSTRIES, INC.

## (undated) DEVICE — PADDING CAST W6INXL4YD ST COT RAYON MICROPLEATED HIGHLY

## (undated) DEVICE — DRESSING FOAM POST OPERATIVE 4X10 IN MEPILEX BORDER AG

## (undated) DEVICE — DRAPE EQUIP C ARM 74X42 IN MOB XR W/ TIE RUBBER BND LF

## (undated) DEVICE — SYRINGE MED 10ML LUERLOCK TIP W/O SFTY DISP

## (undated) DEVICE — SUTURE ABSORBABLE BRAIDED 2-0 CT-1 27 IN UD VICRYL J259H

## (undated) DEVICE — SUTURE MONOCRYL SZ 3-0 L27IN ABSRB UD L24MM PS-1 3/8 CIR PRIM Y936H

## (undated) DEVICE — KIT INSTR W/ 2.4MM GUIDEPIN SUT PASS WIRE NO2 FIBERWIRE(ORDER MULTIPLES OF 5 EA)

## (undated) DEVICE — BIT DRL DIA2.6MM CANN FOR ANK FRAC MGMT SYS

## (undated) DEVICE — BANDAGE COBAN 4 IN COMPR W4INXL5YD FOAM COHESIVE QUIK STK SELF ADH SFT

## (undated) DEVICE — DRAPE,REIN 53X77,STERILE: Brand: MEDLINE

## (undated) DEVICE — LOWER EXTREMITY: Brand: MEDLINE INDUSTRIES, INC.

## (undated) DEVICE — HYPODERMIC SAFETY NEEDLE: Brand: MONOJECT

## (undated) DEVICE — SOLUTION IV 500ML 0.9% SOD BOTTLE CHL LTWT DURABLE SHATTERPROOF

## (undated) DEVICE — PAD,ABDOMINAL,5"X9",ST,LF,25/BX: Brand: MEDLINE INDUSTRIES, INC.

## (undated) DEVICE — SPONGE LAP W18XL18IN WHT COT 4 PLY FLD STRUNG RADPQ DISP ST 2 PER PACK

## (undated) DEVICE — SUTURE VICRYL SZ 1 L27IN ABSRB UD L36MM CP-1 1/2 CIR REV CUT J268H

## (undated) DEVICE — SUTURE TIGERTAPE CERCLAGE W/O NEEDLE

## (undated) DEVICE — BANDAGE,ELASTIC,ESMARK,STERILE,6"X9',LF: Brand: MEDLINE

## (undated) DEVICE — HEWSON SUTURE RETRIEVER: Brand: HEWSON SUTURE RETRIEVER

## (undated) DEVICE — SPONGE GZ W4XL4IN COT 12 PLY TYP VII WVN C FLD DSGN STERILE

## (undated) DEVICE — TOWEL,OR,DSP,ST,BLUE,DLX,6/PK,12PK/CS: Brand: MEDLINE

## (undated) DEVICE — PADDING CAST W4INXL4YD ST COT COHESIVE HND TEARABLE SPEC

## (undated) DEVICE — 3M™ STERI-STRIP™ REINFORCED ADHESIVE SKIN CLOSURES, R1547, 1/2 IN X 4 IN (12 MM X 100 MM), 6 STRIPS/ENVELOPE: Brand: 3M™ STERI-STRIP™

## (undated) DEVICE — TENSIONER SUTURE

## (undated) DEVICE — GLOVE SURG SZ 6 THK91MIL LTX FREE SYN POLYISOPRENE ANTI